# Patient Record
Sex: FEMALE | Race: WHITE | URBAN - METROPOLITAN AREA
[De-identification: names, ages, dates, MRNs, and addresses within clinical notes are randomized per-mention and may not be internally consistent; named-entity substitution may affect disease eponyms.]

---

## 2023-07-24 ENCOUNTER — TELEPHONE (OUTPATIENT)
Dept: RHEUMATOLOGY | Facility: CLINIC | Age: 53
End: 2023-07-24

## 2023-07-24 ENCOUNTER — OFFICE VISIT (OUTPATIENT)
Dept: RHEUMATOLOGY | Facility: CLINIC | Age: 53
End: 2023-07-24

## 2023-07-24 VITALS
HEART RATE: 68 BPM | BODY MASS INDEX: 25.61 KG/M2 | DIASTOLIC BLOOD PRESSURE: 88 MMHG | HEIGHT: 64 IN | SYSTOLIC BLOOD PRESSURE: 127 MMHG | WEIGHT: 150 LBS

## 2023-07-24 DIAGNOSIS — M05.79 RHEUMATOID ARTHRITIS INVOLVING MULTIPLE SITES WITH POSITIVE RHEUMATOID FACTOR (HCC): Primary | ICD-10-CM

## 2023-07-24 DIAGNOSIS — Z51.81 MEDICATION MONITORING ENCOUNTER: ICD-10-CM

## 2023-07-24 PROCEDURE — 99204 OFFICE O/P NEW MOD 45 MIN: CPT | Performed by: INTERNAL MEDICINE

## 2023-07-24 PROCEDURE — 3079F DIAST BP 80-89 MM HG: CPT | Performed by: INTERNAL MEDICINE

## 2023-07-24 PROCEDURE — 3074F SYST BP LT 130 MM HG: CPT | Performed by: INTERNAL MEDICINE

## 2023-07-24 PROCEDURE — 3008F BODY MASS INDEX DOCD: CPT | Performed by: INTERNAL MEDICINE

## 2023-07-24 RX ORDER — CLOBETASOL PROPIONATE 0.5 MG/G
1 CREAM TOPICAL 2 TIMES DAILY
COMMUNITY

## 2023-07-24 RX ORDER — METHYLPREDNISOLONE 4 MG/1
TABLET ORAL
Qty: 1 EACH | Refills: 0 | Status: SHIPPED | OUTPATIENT
Start: 2023-07-24

## 2023-07-24 RX ORDER — CITALOPRAM 20 MG/1
TABLET ORAL
COMMUNITY
Start: 2021-07-24

## 2023-07-24 RX ORDER — LEVOTHYROXINE SODIUM 88 UG/1
TABLET ORAL
COMMUNITY
Start: 2002-07-24

## 2023-07-24 RX ORDER — MEDROXYPROGESTERONE ACETATE 150 MG/ML
50 INJECTION, SUSPENSION INTRAMUSCULAR WEEKLY
COMMUNITY
Start: 2015-07-24 | End: 2023-07-24

## 2023-07-24 RX ORDER — MEDROXYPROGESTERONE ACETATE 150 MG/ML
50 INJECTION, SUSPENSION INTRAMUSCULAR WEEKLY
Qty: 4.2 ML | Refills: 0 | Status: SHIPPED | OUTPATIENT
Start: 2023-07-24

## 2023-07-24 NOTE — TELEPHONE ENCOUNTER
Per [employer]: Prior Authorization duplicate/approved  Notes  An active PA is already on file with expiration date of 03/12/2024.  Please wait to resubmit request within 60 days of that expiration date to obtain a PA renewal.

## 2023-07-24 NOTE — TELEPHONE ENCOUNTER
This patient is on Enbrel weekly. She is a new patient. I sent it to [facility].   I want to make sure that a PA is not needed

## 2023-07-24 NOTE — PATIENT INSTRUCTIONS
You were seen for RA  Having some stiffness in your left index finger  I gave you a Medrol Dosepak  Continue Enbrel for now, [facility]  Blood work tomorrow  See me in 3 months

## 2023-07-25 ENCOUNTER — OFFICE VISIT (OUTPATIENT)
Dept: INTERNAL MEDICINE CLINIC | Facility: CLINIC | Age: 53
End: 2023-07-25

## 2023-07-25 VITALS
WEIGHT: 151 LBS | SYSTOLIC BLOOD PRESSURE: 109 MMHG | HEART RATE: 78 BPM | DIASTOLIC BLOOD PRESSURE: 79 MMHG | BODY MASS INDEX: 25.78 KG/M2 | HEIGHT: 64 IN

## 2023-07-25 DIAGNOSIS — Z12.11 COLON CANCER SCREENING: ICD-10-CM

## 2023-07-25 DIAGNOSIS — Z00.00 PHYSICAL EXAM, ANNUAL: Primary | ICD-10-CM

## 2023-07-25 DIAGNOSIS — R06.83 SNORES: ICD-10-CM

## 2023-07-25 DIAGNOSIS — D22.9 ATYPICAL MOLE: ICD-10-CM

## 2023-07-25 DIAGNOSIS — Z12.31 VISIT FOR SCREENING MAMMOGRAM: ICD-10-CM

## 2023-07-25 DIAGNOSIS — Z12.4 CERVICAL CANCER SCREENING: ICD-10-CM

## 2023-07-25 PROBLEM — M05.79 RHEUMATOID ARTHRITIS INVOLVING MULTIPLE SITES WITH POSITIVE RHEUMATOID FACTOR (HCC): Status: ACTIVE | Noted: 2023-07-25

## 2023-07-25 PROCEDURE — 3008F BODY MASS INDEX DOCD: CPT | Performed by: INTERNAL MEDICINE

## 2023-07-25 PROCEDURE — 99386 PREV VISIT NEW AGE 40-64: CPT | Performed by: INTERNAL MEDICINE

## 2023-07-25 PROCEDURE — 3078F DIAST BP <80 MM HG: CPT | Performed by: INTERNAL MEDICINE

## 2023-07-25 PROCEDURE — 3074F SYST BP LT 130 MM HG: CPT | Performed by: INTERNAL MEDICINE

## 2023-08-08 ENCOUNTER — PATIENT MESSAGE (OUTPATIENT)
Dept: RHEUMATOLOGY | Facility: CLINIC | Age: 53
End: 2023-08-08

## 2023-08-09 NOTE — TELEPHONE ENCOUNTER
From: Alfonso Langley  To: Gregoria Pedroza MD  Sent: 8/8/2023 6:18 PM CDT  Subject: Blood test    Please confirm that Dr Neli Pardo sent the order for blood work to [facility]. I have an appointment next week.

## 2023-08-17 LAB
ABSOLUTE BASOPHILS: 50 CELLS/UL (ref 0–200)
ABSOLUTE EOSINOPHILS: 143 CELLS/UL (ref 15–500)
ABSOLUTE LYMPHOCYTES: 2200 CELLS/UL (ref 850–3900)
ABSOLUTE MONOCYTES: 512 CELLS/UL (ref 200–950)
ABSOLUTE NEUTROPHILS: 2596 CELLS/UL (ref 1500–7800)
ALBUMIN/GLOBULIN RATIO: 1.3 (CALC) (ref 1–2.5)
ALBUMIN: 4.1 G/DL (ref 3.6–5.1)
ALKALINE PHOSPHATASE: 51 U/L (ref 37–153)
ALT: 20 U/L (ref 6–29)
AST: 22 U/L (ref 10–35)
BASOPHILS: 0.9 %
BILIRUBIN, TOTAL: 0.5 MG/DL (ref 0.2–1.2)
BUN: 12 MG/DL (ref 7–25)
CALCIUM: 9.5 MG/DL (ref 8.6–10.4)
CARBON DIOXIDE: 24 MMOL/L (ref 20–32)
CHLORIDE: 104 MMOL/L (ref 98–110)
CREATININE: 0.84 MG/DL (ref 0.5–1.03)
EGFR: 84 ML/MIN/1.73M2
EOSINOPHILS: 2.6 %
GLOBULIN: 3.1 G/DL (CALC) (ref 1.9–3.7)
GLUCOSE: 81 MG/DL (ref 65–99)
HEMATOCRIT: 39.3 % (ref 35–45)
HEMOGLOBIN: 13.1 G/DL (ref 11.7–15.5)
LYMPHOCYTES: 40 %
MCH: 30.3 PG (ref 27–33)
MCHC: 33.3 G/DL (ref 32–36)
MCV: 91 FL (ref 80–100)
MONOCYTES: 9.3 %
MPV: 9.2 FL (ref 7.5–12.5)
NEUTROPHILS: 47.2 %
PLATELET COUNT: 325 THOUSAND/UL (ref 140–400)
POTASSIUM: 4.2 MMOL/L (ref 3.5–5.3)
PROTEIN, TOTAL: 7.2 G/DL (ref 6.1–8.1)
RDW: 12.1 % (ref 11–15)
RED BLOOD CELL COUNT: 4.32 MILLION/UL (ref 3.8–5.1)
SODIUM: 140 MMOL/L (ref 135–146)
WHITE BLOOD CELL COUNT: 5.5 THOUSAND/UL (ref 3.8–10.8)

## 2023-08-18 RX ORDER — LEVOTHYROXINE SODIUM 88 UG/1
88 TABLET ORAL
Qty: 90 TABLET | Refills: 0 | Status: SHIPPED | OUTPATIENT
Start: 2023-08-18

## 2023-08-18 NOTE — TELEPHONE ENCOUNTER
Refill sent to wrong Department.     Requested Prescriptions     Pending Prescriptions Disp Refills    levothyroxine 88 MCG Oral Tab  0     LOV: 7/25/23  Future Appointments   Date Time Provider Basil Orona   9/16/2023  9:40 AM [address]   10/25/2023 11:40 AM Kike Stanley MD 2014 [facility]     Labs:   Component      Latest Ref Rng 8/16/2023   WBC      3.8 - 10.8 Thousand/uL 5.5    RBC      3.80 - 5.10 Million/uL 4.32    Hemoglobin      11.7 - 15.5 g/dL 13.1    Hematocrit      35.0 - 45.0 % 39.3    MCV      80.0 - 100.0 fL 91.0    MCH      27.0 - 33.0 pg 30.3    MCHC      32.0 - 36.0 g/dL 33.3    RDW      11.0 - 15.0 % 12.1    Platelet Count      698 - 400 Thousand/uL 325    MPV      7.5 - 12.5 fL 9.2    Neutrophils Absolute      1,500 - 7,800 cells/uL 2,596    Lymphocytes Absolute      850 - 3,900 cells/uL 2,200    Monocytes Absolute      200 - 950 cells/uL 512    Eosinophils Absolute      15 - 500 cells/uL 143    Basophils Absolute      0 - 200 cells/uL 50    Neutrophils %      % 47.2    Lymphocytes %      % 40.0    Monocytes %      % 9.3    Eosinophils %      % 2.6    Basophils %      % 0.9    Glucose      65 - 99 mg/dL 81    BUN      7 - 25 mg/dL 12    CREATININE      0.50 - 1.03 mg/dL 0.84    EGFR      > OR = 60 mL/min/1.73m2 84    BUN/CREATININE RATIO      6 - 22 (calc) SEE NOTE:    Sodium      135 - 146 mmol/L 140    Potassium      3.5 - 5.3 mmol/L 4.2    Chloride      98 - 110 mmol/L 104    Carbon Dioxide, Total      20 - 32 mmol/L 24    CALCIUM      8.6 - 10.4 mg/dL 9.5    PROTEIN, TOTAL      6.1 - 8.1 g/dL 7.2    Albumin      3.6 - 5.1 g/dL 4.1    Globulin      1.9 - 3.7 g/dL (calc) 3.1    A/G Ratio      1.0 - 2.5 (calc) 1.3    Total Bilirubin      0.2 - 1.2 mg/dL 0.5    Alkaline Phosphatase      37 - 153 U/L 51    AST (SGOT)      10 - 35 U/L 22    ALT (SGPT)      6 - 29 U/L 20    Cholesterol, Total      <200 mg/dL 208 (H)    HDL Cholesterol      > OR = 50 mg/dL 85 Triglycerides      <150 mg/dL 88    LDL Cholesterol Calc      mg/dL (calc) 105 (H)    Chol/HDL Ratio      <5.0 (calc) 2.4    NON-HDL CHOLESTEROL      <130 mg/dL (calc) 123    TSH      mIU/L 0.94       Legend:  (H) High

## 2023-09-05 ENCOUNTER — NURSE ONLY (OUTPATIENT)
Facility: CLINIC | Age: 53
End: 2023-09-05

## 2023-09-05 DIAGNOSIS — Z12.11 COLON CANCER SCREENING: Primary | ICD-10-CM

## 2023-09-05 RX ORDER — SODIUM, POTASSIUM,MAG SULFATES 17.5-3.13G
SOLUTION, RECONSTITUTED, ORAL ORAL
Qty: 1 EACH | Refills: 0 | Status: SHIPPED | OUTPATIENT
Start: 2023-09-05

## 2023-09-14 ENCOUNTER — PATIENT MESSAGE (OUTPATIENT)
Dept: INTERNAL MEDICINE CLINIC | Facility: CLINIC | Age: 53
End: 2023-09-14

## 2023-09-14 ENCOUNTER — PATIENT MESSAGE (OUTPATIENT)
Dept: RHEUMATOLOGY | Facility: CLINIC | Age: 53
End: 2023-09-14

## 2023-09-14 NOTE — TELEPHONE ENCOUNTER
Left voicemail on identified message that Dr Iam Grant has reviewed her message and asked I call to discuss appointments. Informed pt MD currently has an opening 10/18/23 at 5pm and to call as soon as possible if she wants to reschedule to that date. Did tell pt I would put her on a wait list if any appointment opens up sooner. Call back number left.

## 2023-09-14 NOTE — TELEPHONE ENCOUNTER
Yes if patient can make an appointment so we can discuss either adding medications or switching the Enbrel.   She could always be put on the wait list also if the appointments are too far ahead

## 2023-09-14 NOTE — TELEPHONE ENCOUNTER
From: Yodit Running  To: Dilcia Greenberg  Sent: 9/14/2023 1:53 PM CDT  Subject: RA activity    Dr. Ene Luu    My RA is still active despite being consistent with my medication. Most recently it has affected my eye. My eye doctor has given me steroid eye drops that are working.  He asked that I reach out to you to see if we should have another appointment     Gayatri Monzon

## 2023-09-15 ENCOUNTER — PATIENT MESSAGE (OUTPATIENT)
Dept: INTERNAL MEDICINE CLINIC | Facility: CLINIC | Age: 53
End: 2023-09-15

## 2023-09-16 ENCOUNTER — HOSPITAL ENCOUNTER (OUTPATIENT)
Dept: MAMMOGRAPHY | Facility: HOSPITAL | Age: 53
Discharge: HOME | End: 2023-09-16
Attending: INTERNAL MEDICINE
Payer: COMMERCIAL

## 2023-09-16 DIAGNOSIS — Z12.31 VISIT FOR SCREENING MAMMOGRAM: ICD-10-CM

## 2023-09-16 PROCEDURE — 77067 SCR MAMMO BI INCL CAD: CPT | Performed by: INTERNAL MEDICINE

## 2023-09-16 PROCEDURE — 77063 BREAST TOMOSYNTHESIS BI: CPT | Performed by: INTERNAL MEDICINE

## 2023-10-27 ENCOUNTER — OFFICE VISIT (OUTPATIENT)
Dept: RHEUMATOLOGY | Facility: CLINIC | Age: 53
End: 2023-10-27

## 2023-10-27 ENCOUNTER — LAB ENCOUNTER (OUTPATIENT)
Dept: LAB | Facility: HOSPITAL | Age: 53
End: 2023-10-27
Attending: INTERNAL MEDICINE

## 2023-10-27 VITALS
DIASTOLIC BLOOD PRESSURE: 70 MMHG | HEART RATE: 76 BPM | SYSTOLIC BLOOD PRESSURE: 102 MMHG | HEIGHT: 64 IN | BODY MASS INDEX: 25.78 KG/M2 | WEIGHT: 151 LBS

## 2023-10-27 DIAGNOSIS — M05.79 RHEUMATOID ARTHRITIS INVOLVING MULTIPLE SITES WITH POSITIVE RHEUMATOID FACTOR (HCC): Primary | ICD-10-CM

## 2023-10-27 DIAGNOSIS — M05.79 RHEUMATOID ARTHRITIS INVOLVING MULTIPLE SITES WITH POSITIVE RHEUMATOID FACTOR (HCC): ICD-10-CM

## 2023-10-27 DIAGNOSIS — Z51.81 ENCOUNTER FOR THERAPEUTIC DRUG MONITORING: ICD-10-CM

## 2023-10-27 LAB
ALBUMIN SERPL-MCNC: 3.9 G/DL (ref 3.4–5)
ALT SERPL-CCNC: 19 U/L
AST SERPL-CCNC: 18 U/L (ref 15–37)
BASOPHILS # BLD AUTO: 0.06 X10(3) UL (ref 0–0.2)
BASOPHILS NFR BLD AUTO: 0.8 %
CREAT BLD-MCNC: 0.89 MG/DL
CRP SERPL-MCNC: 0.93 MG/DL (ref ?–0.3)
DEPRECATED RDW RBC AUTO: 41.7 FL (ref 35.1–46.3)
EGFRCR SERPLBLD CKD-EPI 2021: 78 ML/MIN/1.73M2 (ref 60–?)
EOSINOPHIL # BLD AUTO: 0.19 X10(3) UL (ref 0–0.7)
EOSINOPHIL NFR BLD AUTO: 2.4 %
ERYTHROCYTE [DISTWIDTH] IN BLOOD BY AUTOMATED COUNT: 12.2 % (ref 11–15)
ERYTHROCYTE [SEDIMENTATION RATE] IN BLOOD: 22 MM/HR
HBV CORE AB SERPL QL IA: NONREACTIVE
HBV SURFACE AB SER QL: NONREACTIVE
HBV SURFACE AB SERPL IA-ACNC: <3.1 MIU/ML
HBV SURFACE AG SER-ACNC: <0.1 [IU]/L
HBV SURFACE AG SERPL QL IA: NONREACTIVE
HCT VFR BLD AUTO: 40.4 %
HCV AB SERPL QL IA: NONREACTIVE
HGB BLD-MCNC: 13.7 G/DL
IMM GRANULOCYTES # BLD AUTO: 0.02 X10(3) UL (ref 0–1)
IMM GRANULOCYTES NFR BLD: 0.3 %
LYMPHOCYTES # BLD AUTO: 2.89 X10(3) UL (ref 1–4)
LYMPHOCYTES NFR BLD AUTO: 36.3 %
MCH RBC QN AUTO: 31.3 PG (ref 26–34)
MCHC RBC AUTO-ENTMCNC: 33.9 G/DL (ref 31–37)
MCV RBC AUTO: 92.2 FL
MONOCYTES # BLD AUTO: 0.57 X10(3) UL (ref 0.1–1)
MONOCYTES NFR BLD AUTO: 7.2 %
NEUTROPHILS # BLD AUTO: 4.23 X10 (3) UL (ref 1.5–7.7)
NEUTROPHILS # BLD AUTO: 4.23 X10(3) UL (ref 1.5–7.7)
NEUTROPHILS NFR BLD AUTO: 53 %
PLATELET # BLD AUTO: 387 10(3)UL (ref 150–450)
RBC # BLD AUTO: 4.38 X10(6)UL
WBC # BLD AUTO: 8 X10(3) UL (ref 4–11)

## 2023-10-27 PROCEDURE — 86704 HEP B CORE ANTIBODY TOTAL: CPT | Performed by: INTERNAL MEDICINE

## 2023-10-27 PROCEDURE — 36415 COLL VENOUS BLD VENIPUNCTURE: CPT

## 2023-10-27 PROCEDURE — 85652 RBC SED RATE AUTOMATED: CPT

## 2023-10-27 PROCEDURE — 82040 ASSAY OF SERUM ALBUMIN: CPT

## 2023-10-27 PROCEDURE — 86480 TB TEST CELL IMMUN MEASURE: CPT | Performed by: INTERNAL MEDICINE

## 2023-10-27 PROCEDURE — 86140 C-REACTIVE PROTEIN: CPT

## 2023-10-27 PROCEDURE — 84450 TRANSFERASE (AST) (SGOT): CPT

## 2023-10-27 PROCEDURE — 86803 HEPATITIS C AB TEST: CPT | Performed by: INTERNAL MEDICINE

## 2023-10-27 PROCEDURE — 99214 OFFICE O/P EST MOD 30 MIN: CPT | Performed by: INTERNAL MEDICINE

## 2023-10-27 PROCEDURE — 3078F DIAST BP <80 MM HG: CPT | Performed by: INTERNAL MEDICINE

## 2023-10-27 PROCEDURE — 3074F SYST BP LT 130 MM HG: CPT | Performed by: INTERNAL MEDICINE

## 2023-10-27 PROCEDURE — 82565 ASSAY OF CREATININE: CPT

## 2023-10-27 PROCEDURE — 3008F BODY MASS INDEX DOCD: CPT | Performed by: INTERNAL MEDICINE

## 2023-10-27 PROCEDURE — 85025 COMPLETE CBC W/AUTO DIFF WBC: CPT

## 2023-10-27 PROCEDURE — 87340 HEPATITIS B SURFACE AG IA: CPT | Performed by: INTERNAL MEDICINE

## 2023-10-27 PROCEDURE — 86706 HEP B SURFACE ANTIBODY: CPT | Performed by: INTERNAL MEDICINE

## 2023-10-27 PROCEDURE — 84460 ALANINE AMINO (ALT) (SGPT): CPT

## 2023-10-27 RX ORDER — METHOTREXATE 2.5 MG/1
10 TABLET ORAL WEEKLY
Qty: 52 TABLET | Refills: 0 | Status: SHIPPED | OUTPATIENT
Start: 2023-10-27

## 2023-10-27 RX ORDER — FOLIC ACID 1 MG/1
1 TABLET ORAL DAILY
Qty: 90 TABLET | Refills: 0 | Status: SHIPPED | OUTPATIENT
Start: 2023-10-27

## 2023-10-27 RX ORDER — CYCLOSPORINE 0.5 MG/ML
1 EMULSION OPHTHALMIC EVERY 12 HOURS
COMMUNITY
Start: 2023-10-13

## 2023-10-27 NOTE — PROGRESS NOTES
Dmitri John is a 47year old female. HPI:   Patient presents with:  Rheumatoid Arthritis    Ophthalmogy Dr. Cody Tanner at [facility]     I had the pleasure of seeing Dmitri John on 10/27/2023 for follow up Seropositive RA     Current medications:  Enbrel weekly- started 2014  Previous medications:  Methotrexate- [id number]  Blood work:  RF 72, CCP>250    Interval History: This is a 48 yo F with hx of Hypothyroid, GERD, Lichen sclerosis, Depression and Seropositive RA presents to establish care for RA. She was diagnosed in 2009 when she presented with symmetrical joint pain and swelling involving her hands, wrists, shoulders and feet. She was initially placed on methotrexate titrated up to 10 pills weekly. She then moved to [facility] and was placed on Enbrel weekly in 2014. She stopped methotrexate. She has been doing well on Enbrel. She lost her insurance and was off Enbrel for about 1.5-month from February to March. She started to experience some more joint pain involving her hands. Now back on Enbrel. Having some stiffness in her left index finger. Otherwise no other joint pain or swelling. No history of psoriasis. No lower back pain. 10/27/2023:  Presents for f/u of Seropositive RA  On Enbrel now for the past 3 mos, was off of it due to moving   Was not doing well up until Saturday in hands and feet, swelling in the fingers and stiffness   Still has some stiffness in the pinky's and the left index finger, mild swelling  Was on methotrexate previously and wants to try it again.  Also Lichen sclerosis has worsened off methotrexate   Was seen by eye doctor for left eye pain and was seen by ophthalmology and was told there was inflammation from RA      HISTORY:  Past Medical History:   Diagnosis Date    Depression     Hypothyroidism     Rheumatoid arthritis ([facility] [address]       Social Hx Reviewed   Family Hx Reviewed     Medications (Active prior to today's visit):  Current Outpatient Medications   Medication Sig Dispense Refill    Na Sulfate-K Sulfate-Mg Sulf (SUPREP BOWEL PREP KIT) 17.5-3.13-1.6 GM/177ML Oral Solution Take as discussed in clinic 1 each 0    levothyroxine 88 MCG Oral Tab Take 1 tablet (88 mcg total) by mouth before breakfast. 90 tablet 0    clobetasol 0.05 % External Cream Apply 1 Application topically in the morning and 1 Application before bedtime. citalopram 20 MG Oral Tab       ENBREL SURECLICK 50 MG/ML Subcutaneous Solution Auto-injector Inject 50 mg into the skin once a week. 4.2 mL 0    methylPREDNISolone 4 MG Oral Tablet Therapy Pack Take as directed on package. 1 each 0     .cmed  Allergies:  No Active Allergies      ROS:   All other ROS are negative. PHYSICAL EXAM:   GEN: AAOx3, NAD  HEENT: EOMI, PERRLA, no injection or icterus, oral mucosa moist, no oral lesions. No lymphadenopathy. No facial rash  CVS: RRR, no murmurs rubs or gallops. Equal 2+ distal pulses. LUNGS: CTAB, no increased work of breathing  ABDOMEN:  soft NT/ND, +BS, no HSM  SKIN: No rashes or skin lesions. No nail findings  MSK:  Cervical spine: FROM  Hands: no synovitis in DIP, PIP and MCP, strong full fists  Wrist: FROM, no pain or swelling or warmth on palpation  Elbow: FROM, no pain or swelling or warmth on palpation  Shoulders: FROM, no pain or swelling or warmth on palpation  Hip: normal log roll, no lateral hip pain, ORAL test negative b/l  Knees: FROM, no warmth or effusion present. No pain with ROM. Ankles: FROM, no pain or swelling or warmth on palpation  Feet: no pain with MTP squeeze, no toe swelling or pain or warmth on palpation with FROM  Spine: no lumbar or sacral pain on palpation. NEURO: Cranial nerves II-XII intact grossly. 5/5 strength throughout in both upper and lower extremities, sensation intact.   PSYCH: normal mood       LABS:     Component      Latest Ref Rng 10/27/2023   WBC      4.0 - 11.0 x10(3) uL 8.0    RBC      3.80 - 5.30 x10(6)uL 4.38    Hemoglobin      12.0 - 16.0 g/dL 13.7 Hematocrit      35.0 - 48.0 % 40.4    MCV      80.0 - 100.0 fL 92.2    MCH      26.0 - 34.0 pg 31.3    MCHC      31.0 - 37.0 g/dL 33.9    RDW-SD      35.1 - 46.3 fL 41.7    RDW      11.0 - 15.0 % 12.2    Platelet Count      846.7 - 450.0 10(3)uL 387.0    Prelim Neutrophil Abs      1.50 - 7.70 x10 (3) uL 4.23    Neutrophils Absolute      1.50 - 7.70 x10(3) uL 4.23    Lymphocytes Absolute      1.00 - 4.00 x10(3) uL 2.89    Monocytes Absolute      0.10 - 1.00 x10(3) uL 0.57    Eosinophils Absolute      0.00 - 0.70 x10(3) uL 0.19    Basophils Absolute      0.00 - 0.20 x10(3) uL 0.06    Immature Granulocyte Absolute      0.00 - 1.00 x10(3) uL 0.02    Neutrophils %      % 53.0    Lymphocytes %      % 36.3    Monocytes %      % 7.2    Eosinophils %      % 2.4    Basophils %      % 0.8    Immature Granulocyte %      % 0.3    CREATININE      0.55 - 1.02 mg/dL 0.89    EGFR      >=60 mL/min/1.73m2 78    Albumin      3.4 - 5.0 g/dL 3.9    ALT (SGPT)      13 - 56 U/L 19    AST (SGOT)      15 - 37 U/L 18    C-REACTIVE PROTEIN      <0.30 mg/dL 0.93 (H)    SED RATE      0 - 30 mm/Hr 22      Imaging:     XR hands 2013: There are no signs of acute trauma or is degenerative change. There is some very minimal juxtaarticular osteoporosis, but there is no evidence of joint soft-tissue swelling or marginal erosion. ASSESSMENT/PLAN:     Seropositive RA  - Found to have + RF and CCP. No erosions on x-rays  - Restarted Enbrel 3 mos ago, initially continued to have a lot of joint pain and swelling but symptoms improved last week  - She was on methotrexate previously. Her lichen planus has worsened off methotrexate.   Recently had inflammationin her eyes and was told it was from her RA  - Plan to add methotrexate 4 pills weekly and folic acid daily  - Blood work reviewed with patient, will continue to monitor every 3 months    Pt will f/u in 3-4 mos     Sidney Barreto MD  10/27/2023   12:00 PM

## 2023-10-27 NOTE — PATIENT INSTRUCTIONS
You were seen today for rheumatoid arthritis  You are having more joint pain but now is better on Enbrel  Plan to continue Enbrel weekly  Adding methotrexate 4 pills every 7 days and folic acid 1 pill a day  Blood work today and in 3 months  See me end of January or beginning of February

## 2023-10-27 NOTE — TELEPHONE ENCOUNTER
LOV: 10/27/23  Last Refilled:#4.2ml, 0rfs 7/24/23    Future Appointments   Date Time Provider Basil Fabiani   11/28/2023  9:30 AM Emma Templeton MD [address]   11/29/2023  7:30 AM [place], PROCEDURE [address] None   1/30/2024  8:40 AM Juanita Tomlinson MD 2014 [facility]     You were seen today for rheumatoid arthritis  You are having more joint pain but now is better on Enbrel  Plan to continue Enbrel weekly  Adding methotrexate 4 pills every 7 days and folic acid 1 pill a day  Blood work today and in 3 months  See me end of January or beginning of February            Electronically signed by Juanita Tomlinson MD at 10/27/2023 12:21 PM  Please advise.

## 2023-10-30 LAB
M TB IFN-G CD4+ T-CELLS BLD-ACNC: 0.04 IU/ML
M TB TUBERC IFN-G BLD QL: NEGATIVE
M TB TUBERC IGNF/MITOGEN IGNF CONTROL: >10 IU/ML
QFT TB1 AG MINUS NIL: 0.09 IU/ML
QFT TB2 AG MINUS NIL: 0.05 IU/ML

## 2023-10-31 RX ORDER — MEDROXYPROGESTERONE ACETATE 150 MG/ML
50 INJECTION, SUSPENSION INTRAMUSCULAR WEEKLY
Qty: 4.2 ML | Refills: 0 | Status: SHIPPED | OUTPATIENT
Start: 2023-10-31

## 2023-11-27 RX ORDER — CITALOPRAM 20 MG/1
20 TABLET ORAL DAILY
Qty: 90 TABLET | Refills: 2 | Status: SHIPPED | OUTPATIENT
Start: 2023-11-27

## 2023-11-27 RX ORDER — LEVOTHYROXINE SODIUM 88 UG/1
88 TABLET ORAL
Qty: 90 TABLET | Refills: 3 | Status: SHIPPED | OUTPATIENT
Start: 2023-11-27

## 2023-11-27 NOTE — TELEPHONE ENCOUNTER
Refill passed per [facility], [facility] protocol. Medication is listed as patient reported.     Requested Prescriptions   Pending Prescriptions Disp Refills    citalopram 20 MG Oral Tab  0       Psychiatric Non-Scheduled (Anti-Anxiety) Passed - 11/26/2023  5:44 PM        Passed - In person appointment or virtual visit in the past 6 mos or appointment in next 3 mos     Recent Outpatient Visits              1 month ago Rheumatoid arthritis involving multiple sites with positive rheumatoid factor ([facility] [address]    [address], [address], Gregoria Tompkins MD    Office Visit    2 months ago Colon cancer screening    [address], [address], [place]    Nurse Only    4 months ago Physical exam, annual    [address] [facility], Paige Lombard, MD    Office Visit    4 months ago Rheumatoid arthritis involving multiple sites with positive rheumatoid factor [facility])    [address], [address], Gregoria Tompkins MD    Office Visit          Future Appointments         Provider Department Appt Notes    Tomorrow Yomi Mejias MD [facility], [address], [place] - OB/GYN Lichen sclerosus    In 2 days [place], [address], [address], [place] Kapil @ [address]    In 2 months Kirsten Paredes MD [address], [address], [place] follow up                  Recent Outpatient Visits              1 month ago Rheumatoid arthritis involving multiple sites with positive rheumatoid factor ([facility] [address]    [address], [address], Gregoria Tompkins MD    Office Visit    2 months ago Colon cancer screening    [address], [address], [place]    Nurse Only    4 months ago Physical exam, annual    [address] [facility], Paige Lombard, MD    Office Visit    4 months ago Rheumatoid arthritis involving multiple sites with positive rheumatoid factor ([facility] [address]    [address], [address], Isra Cano MD    Office Visit          Future Appointments         Provider Department Appt Notes    Tomorrow [facility], [address], [address] - OB/GYN Lichen sclerosus    In 2 days [place], [address], [address], [place] [facility] @ [facility]    In 2 months Juanita Tomlinson MD [address], [facility] follow up

## 2023-11-27 NOTE — TELEPHONE ENCOUNTER
Requested Prescriptions     Pending Prescriptions Disp Refills    LEVOTHYROXINE 88 MCG Oral Tab [Pharmacy Med Name: L-THYROXINE (SYNTHROID) TABS 88MCG] 90 tablet 3     Sig: TAKE 1 TABLET BEFORE BREAKFAST     LOV: 7/23/23  Future Appointments   Date Time Provider Basil Orona   11/28/2023  9:30 AM Lulu Barreto MD [address] [facility] [address]  [facility] [address]   11/29/2023  7:30 AM [place], PROCEDURE [address] None   1/30/2024  8:40 AM Cathy Keith MD 2014 [facility]     Labs:   Component      Latest Ref Rng 8/16/2023   TSH      mIU/L 0.94

## 2023-11-28 ENCOUNTER — OFFICE VISIT (OUTPATIENT)
Dept: OBGYN CLINIC | Facility: CLINIC | Age: 53
End: 2023-11-28
Payer: COMMERCIAL

## 2023-11-28 ENCOUNTER — LAB ENCOUNTER (OUTPATIENT)
Dept: LAB | Facility: HOSPITAL | Age: 53
End: 2023-11-28
Attending: INTERNAL MEDICINE
Payer: COMMERCIAL

## 2023-11-28 VITALS
DIASTOLIC BLOOD PRESSURE: 78 MMHG | SYSTOLIC BLOOD PRESSURE: 110 MMHG | HEIGHT: 64 IN | WEIGHT: 149.81 LBS | BODY MASS INDEX: 25.57 KG/M2

## 2023-11-28 DIAGNOSIS — Z00.00 PHYSICAL EXAM, ANNUAL: ICD-10-CM

## 2023-11-28 DIAGNOSIS — N90.4 LICHEN SCLEROSUS OF VULVA: Primary | ICD-10-CM

## 2023-11-28 DIAGNOSIS — M05.79 RHEUMATOID ARTHRITIS INVOLVING MULTIPLE SITES WITH POSITIVE RHEUMATOID FACTOR (HCC): ICD-10-CM

## 2023-11-28 DIAGNOSIS — Z51.81 ENCOUNTER FOR THERAPEUTIC DRUG MONITORING: ICD-10-CM

## 2023-11-28 DIAGNOSIS — N90.89 VULVAR SKIN TAG: ICD-10-CM

## 2023-11-28 LAB
ALBUMIN SERPL-MCNC: 4.5 G/DL (ref 3.2–4.8)
ALT SERPL-CCNC: 16 U/L
AST SERPL-CCNC: 23 U/L (ref ?–34)
BASOPHILS # BLD AUTO: 0.06 X10(3) UL (ref 0–0.2)
BASOPHILS NFR BLD AUTO: 1 %
CHOLEST SERPL-MCNC: 229 MG/DL (ref ?–200)
CREAT BLD-MCNC: 1.01 MG/DL
CRP SERPL-MCNC: <0.4 MG/DL (ref ?–1)
DEPRECATED RDW RBC AUTO: 41.6 FL (ref 35.1–46.3)
EGFRCR SERPLBLD CKD-EPI 2021: 67 ML/MIN/1.73M2 (ref 60–?)
EOSINOPHIL # BLD AUTO: 0.14 X10(3) UL (ref 0–0.7)
EOSINOPHIL NFR BLD AUTO: 2.3 %
ERYTHROCYTE [DISTWIDTH] IN BLOOD BY AUTOMATED COUNT: 12.7 % (ref 11–15)
ERYTHROCYTE [SEDIMENTATION RATE] IN BLOOD: 25 MM/HR
FASTING PATIENT LIPID ANSWER: YES
HCT VFR BLD AUTO: 40.5 %
HDLC SERPL-MCNC: 84 MG/DL (ref 40–59)
HGB BLD-MCNC: 13.5 G/DL
IMM GRANULOCYTES # BLD AUTO: 0.02 X10(3) UL (ref 0–1)
IMM GRANULOCYTES NFR BLD: 0.3 %
LDLC SERPL CALC-MCNC: 136 MG/DL (ref ?–100)
LYMPHOCYTES # BLD AUTO: 2.37 X10(3) UL (ref 1–4)
LYMPHOCYTES NFR BLD AUTO: 38.9 %
MCH RBC QN AUTO: 30.3 PG (ref 26–34)
MCHC RBC AUTO-ENTMCNC: 33.3 G/DL (ref 31–37)
MCV RBC AUTO: 91 FL
MONOCYTES # BLD AUTO: 0.35 X10(3) UL (ref 0.1–1)
MONOCYTES NFR BLD AUTO: 5.7 %
NEUTROPHILS # BLD AUTO: 3.16 X10 (3) UL (ref 1.5–7.7)
NEUTROPHILS # BLD AUTO: 3.16 X10(3) UL (ref 1.5–7.7)
NEUTROPHILS NFR BLD AUTO: 51.8 %
NONHDLC SERPL-MCNC: 145 MG/DL (ref ?–130)
PLATELET # BLD AUTO: 351 10(3)UL (ref 150–450)
RBC # BLD AUTO: 4.45 X10(6)UL
TRIGL SERPL-MCNC: 55 MG/DL (ref 30–149)
TSI SER-ACNC: 0.88 MIU/ML (ref 0.55–4.78)
VLDLC SERPL CALC-MCNC: 10 MG/DL (ref 0–30)
WBC # BLD AUTO: 6.1 X10(3) UL (ref 4–11)

## 2023-11-28 PROCEDURE — 3078F DIAST BP <80 MM HG: CPT | Performed by: OBSTETRICS & GYNECOLOGY

## 2023-11-28 PROCEDURE — 85025 COMPLETE CBC W/AUTO DIFF WBC: CPT

## 2023-11-28 PROCEDURE — 86140 C-REACTIVE PROTEIN: CPT

## 2023-11-28 PROCEDURE — 86480 TB TEST CELL IMMUN MEASURE: CPT | Performed by: INTERNAL MEDICINE

## 2023-11-28 PROCEDURE — 80061 LIPID PANEL: CPT

## 2023-11-28 PROCEDURE — 82565 ASSAY OF CREATININE: CPT

## 2023-11-28 PROCEDURE — 36415 COLL VENOUS BLD VENIPUNCTURE: CPT

## 2023-11-28 PROCEDURE — 84443 ASSAY THYROID STIM HORMONE: CPT

## 2023-11-28 PROCEDURE — 85652 RBC SED RATE AUTOMATED: CPT

## 2023-11-28 PROCEDURE — 3008F BODY MASS INDEX DOCD: CPT | Performed by: OBSTETRICS & GYNECOLOGY

## 2023-11-28 PROCEDURE — 82040 ASSAY OF SERUM ALBUMIN: CPT

## 2023-11-28 PROCEDURE — 3074F SYST BP LT 130 MM HG: CPT | Performed by: OBSTETRICS & GYNECOLOGY

## 2023-11-28 PROCEDURE — 99203 OFFICE O/P NEW LOW 30 MIN: CPT | Performed by: OBSTETRICS & GYNECOLOGY

## 2023-11-28 PROCEDURE — 84450 TRANSFERASE (AST) (SGOT): CPT

## 2023-11-28 PROCEDURE — 84460 ALANINE AMINO (ALT) (SGPT): CPT

## 2023-11-28 NOTE — PROGRESS NOTES
New Patient GYN History and Physical  [facility] OB/GYN    CHIEF COMPLAINT:    Chief Complaint   Patient presents with    [facility] Care    Vaginal Problem     Lichen  sclerosus      HISTORY OF PRESENT ILLNESS:   Darryle Dames is a 47year old female   who presents to establish care for lichen sclerosus - states daignosed in , but states had symptoms as a child. Previously treated with topical steroids/clobetasol. History rheumatoid arthritis and was placed on methotrexate and had no flare ups, but was switched for embryl and flareups returned. More recently was restarted on methotrexate x 4 weeks and has noticed improvement. States recently had flare up x 2 months. States uses clobetasol 2-3/day with flare ups. Otherwise uses once weekly. Has noticed that 2-3 weeks before menses has flare up and resolves without treatment. States has new skin tag noted about 4 to 5 months ago. Perimenopausal - last menses . In last year had 2 menses. PAST GYNECOLOGICAL HISTORY & OTHER PREVENTIVE MEDICINE  LMP: Patient's last menstrual period was 2023 (approximate).   Menarche: 16years old (2023  9:39 AM)  Period Cycle (Days): Lmp 2023 , prior to this 2022 (2023  9:39 AM)  Hx Prior Abnormal Pap: Yes (hpv over 15 years ago) (2023  9:39 AM)  Pap Date: 21 (2023  1:62 AM)    Complications: denies  Gravita/Parity:   Contraception: current -vasectomy; Previous - oral contraceptive pill, Depo Provera  Sexually transmitted disease history:HPV  Number of sexual partners: current sexual partners: 1, yrs, Lifetime partners:   Pap history: NILM, neg HRHPV Last pap/result: 2021; history abnormals: colposcopy  Date of last mammogram: UTD, normal; history abnormals denies  Last Colonoscopy: scheduled ; history abnormals   Abuse history: denies  Vaginal discharge: denies  Bladder symptoms: denies    PAST MEDICAL HISTORY:   Past Medical History:   Diagnosis Date    Depression Disorder of thyroid     Hypothyroidism     Rheumatoid arthritis ([facility] [address]         PAST SURGICAL HISTORY:   Past Surgical History:   Procedure Laterality Date    Tonsillectomy          PAST OB HISTORY:  OB History    Para Term  AB Living   0 0           SAB IAB Ectopic Multiple Live Births                   CURRENT MEDICATIONS:      Current Outpatient Medications:     levothyroxine 88 MCG Oral Tab, Take 1 tablet (88 mcg total) by mouth before breakfast., Disp: 90 tablet, Rfl: 3    citalopram 20 MG Oral Tab, Take 1 tablet (20 mg total) by mouth daily. , Disp: 90 tablet, Rfl: 2    ENBREL SURECLICK 50 MG/ML Subcutaneous Solution Auto-injector, Inject 50 mg into the skin once a week., Disp: 4.2 mL, Rfl: 0    cycloSPORINE 0.05 % Ophthalmic Emulsion, Place 1 drop into both eyes every 12 (twelve) hours. , Disp: , Rfl:     methotrexate 2.5 MG Oral Tab, Take 4 tablets (10 mg total) by mouth once a week., Disp: 52 tablet, Rfl: 0    folic acid 1 MG Oral Tab, Take 1 tablet (1 mg total) by mouth daily. , Disp: 90 tablet, Rfl: 0    Na Sulfate-K Sulfate-Mg Sulf (SUPREP BOWEL PREP KIT) 17.5-3.13-1.6 GM/177ML Oral Solution, Take as discussed in clinic, Disp: 1 each, Rfl: 0    clobetasol 0.05 % External Cream, Apply 1 Application topically in the morning and 1 Application before bedtime. , Disp: , Rfl:     methylPREDNISolone 4 MG Oral Tablet Therapy Pack, Take as directed on package. , Disp: 1 each, Rfl: 0    ALLERGIES:  No Known Allergies    SOCIAL HISTORY:  Social History     Socioeconomic History    Marital status:    Tobacco Use    Smoking status: Never    Smokeless tobacco: Never   Vaping Use    Vaping Use: Never used   Substance and Sexual Activity    Alcohol use: Yes     Comment: Rarely    Drug use: Yes     Types: Cannabis    Sexual activity: Yes   Other Topics Concern    Blood Transfusions No       FAMILY HISTORY:  No family history on file.   ASSESSMENTS:  PHYSICAL EXAM:   Patient's last menstrual period was 06/01/2023 (approximate). Vitals:    11/28/23 0937   BP: 110/78   Weight: 149 lb 12.8 oz (67.9 kg)   Height: 64\"     CONSTITUTIONAL: Awake, alert, cooperative, no apparent distress, and appears stated age   NECK: Supple, symmetrical, trachea midline, no adenopathy, thyroid symmetric, not enlarged      GENITAL/URINARY:    External Genitalia:  General appearance; normal, Hair distribution; normal, Lesions - agglutination of labia minora anteriorly under clitoral aden. Narrowing of introitus present. 7 mm flat white lesion inner right labia minora; polypoid lesion right of labia majora 3 x 5 mm  Urethral Meatus:  Lesions absent, Prolapse absent  Bladder:  Tenderness absent, Cystocele absent  Vagina:  Discharge absent, Lesions absent, Pelvic support normal  Cervix:  Lesions absent, Discharge absent, Tenderness absent  Uterus:  Size normal, Masses absent, Tenderness absent  Adnexa: Masses absent, Tenderness absent  Anus/Perineum:  Lesions absent    MUSCULOSKELETAL: There is no redness, warmth, or swelling of the joints. Full range of motion noted. Motor strength is 5 out of 5 all extremities bilaterally. Tone is normal.  NEUROLOGIC: Patient is awake, alert and oriented to name, place and time. Casual gait is normal.  SKIN: no bruising or bleeding and no rashes  PSYCHIATRIC: Behavior:  Appropriate  Mood:  appropriate  ASSESSMENT AND PLAN:  1. Lichen sclerosus of vulva  Reviewed care measures. Written info provided. Continue weekly clobetasol for suppression and perform taper with flares. Aveeno oatmeal bath as well. Exams q 6 months to examine for new lesions. Has had some improvement with restart of methotrexate.     Follow up for punch biopsy of labial lesion and removal skin tag.     2. Vulvar skin tag    Pato Holliday MD

## 2023-11-28 NOTE — PATIENT INSTRUCTIONS
Patient education: Vulvar lichen sclerosus (Beyond the Basics)  AUTHORS:  JAMES Fung [facility], [facility], MD Dennis El MBBS [facility] BSc [facility])  SECTION EDITORS:  MD Yves Mace MD, [place], [place]  DEPUTY :  Layne Coelho MD    All topics are updated as new evidence becomes available and our peer review process is complete. Literature review current through: Oct 2023. This topic last updated: Dec 21, 2020. Please read the Disclaimer at the end of this page. INTRODUCTION  Lichen sclerosus (LS) is a skin disorder that causes the skin to become thin, whitened, and wrinkled, and can cause itching and pain. LS usually occurs in postmenopausal women, although men, children, and premenopausal women may be affected. It can develop on any skin surface, but in women it most commonly occurs near the clitoris, on the labia (the inner and outer genital lips), and in the anal region (figure 1). In 15 to 20 percent of patients, LS lesions develop on other skin surfaces, such as the thighs, breasts, wrists, shoulders, neck, and even inside the mouth. It is not clear exactly how many people have LS. Estimates for LS involving the female genitals vary from 1 in 27 older adult women seen in general gynecology offices to 1 in 300 to 1000 patients referred to dermatologists. LICHEN SCLEROSUS CAUSES AND RISK FACTORS  The cause of lichen sclerosus (LS) is not clear; health care providers suspect that a number of factors may be involved. Genetic factors -- LS seems to be more common in some families. People who are genetically predisposed to LS may develop symptoms after experiencing trauma, injury, or sexual abuse. Disorders of the immune system -- LS in females may be an autoimmune disorder, in which the body's immune system mistakenly attacks and injures the skin.  Women with LS are at greater risk of developing other autoimmune disorders, such as some types of thyroid disease, anemia, diabetes, alopecia areata, and vitiligo [1]. Infections -- Researchers have not been able to clearly demonstrate any relationship between infections and LS. LS is not contagious. Hormones -- LS is more common in prepubertal girls and postmenopausal women, suggesting that hormonal changes influence the disease. However, treatments such as hormone replacement therapy or the application of testosterone or progesterone have not been shown to be effective for females with LS. Urine -- There is evidence that urine may contribute to genital LS in males, in that microscopic droplets of urine may pool between the glans penis and the foreskin, contributing to LS in uncircumcised men. However, whether urine plays a part in genital LS in females is unknown. LICHEN SCLEROSUS (LS) SIGNS AND SYMPTOMS   Features of genital LS in women -- Some women with genital LS feel dull, painful discomfort in the vulva, while other women have no symptoms. The most common symptoms include:  ?Vulvar itching - The most common symptom of LS is itching. It may be so severe that it interferes with sleep. ? Anal itching, fissures, bleeding, and pain - (See \"Patient education: Anal fissure (Beyond the Basics)\". )  ? Painful sexual intercourse (dyspareunia) - This can occur as a result of repeated cracking of the skin (fissuring) or from narrowing of the vaginal opening due to scarring. Typically, women with genital LS have thin, white, wrinkled skin on the labia, often extending down and around the anus (figure 1). Purple-colored areas of bruising may be seen. Cracks (also known as fissures) may form in the skin in the area around the anus, the labia, and the clitoris. Relatively minor rubbing or sex may lead to bleeding due to the fragility of the involved skin. Genital LS may progress and change the appearance of the genital area as the outer and inner lips of the vulva fuse (stick together) and cover the clitoris.  The opening of the vagina can become narrowed, and cracks, fissures, and thickened, scarred skin in the genital and anal area can make sexual intercourse or genital examination painful. LS does not affect the inner reproductive organs, such as the vagina and uterus. LICHEN SCLEROSUS DIAGNOSIS  Providers typically use the following methods to diagnose lichen sclerosus (LS). History and physical examination -- A medical history and physical examination of the vulvar and anal areas will be done, looking for the signs and symptoms of LS. A general skin examination may also be performed to exclude LS elsewhere on the body. Biopsy -- To confirm a suspected diagnosis of LS, a biopsy is recommended. A small piece of the affected skin will be removed and sent to a pathologist to be examined with a microscope. Excluding other conditions -- Tests may be done to exclude other conditions that could cause symptoms similar to those of LS, such as:  ?Lichen planus (a skin disease that can also cause itching and fusing of genital skin). Lichen planus can occur together with LS. ? Low estrogen level (a lack of the hormone estrogen can rarely cause fusing of genital skin but is often the cause of painful intercourse). (See \"Patient education: Vaginal dryness (Beyond the Basics)\". )  ? Vitiligo (a disorder that can cause white skin patches similar to those of LS). Vitiligo can occur together with LS. ? Pemphigoid (a blistering skin disorder that also causes scarring of the vulva) is extremely rare. ? Infections can cause similar symptoms but usually do not cause the typical skin changes of LS. However, infection can occur together with LS. LS and cancer -- Women with LS affecting the vulva are at a slightly increased risk for developing squamous cell skin cancer of the vulva. Diagnosing genital LS early, treating it effectively, and biopsying any abnormal areas may help to reduce the risk of developing or missing a diagnosis of skin cancer.  A once-yearly examination of the skin of the vulva is recommended, and women should examine themselves regularly for lumps or sores that do not heal. A biopsy should be performed if there are areas that do not improve with treatment. There is early evidence to suggest that good control of LS may reduce the risk of vulval cancer. LS lesions outside the genital area do not have an increased risk of cancer. LS and painful sexual intercourse -- LS can lead to constriction of the vaginal opening and pain during sexual intercourse. Women who experience pain during sex first require treatment to suppress any active disease. Once the disease is controlled, some clinicians may recommend an estrogen cream to help to soften the skin around the vaginal opening. Devices called vaginal dilators, which patients can use at home, also may be used to slowly stretch the skin. Pain with intercourse can also occur from other causes. Patients who notice pain during intercourse should discuss their symptoms with their health care providers. LICHEN SCLEROSUS TREATMENT  The goals of treatment of lichen sclerosus (LS) are to relieve bothersome symptoms and to prevent the condition from worsening. A clinician may recommend medication for the physical symptoms, and may refer the patient for support and therapy for other issues associated with the condition, such as problems with sex. All patients with genital LS, even those without noticeable symptoms, need to use medication on a regular and ongoing basis. Patients also should see a health care provider for reevaluation of the disease at least once or twice yearly. Patients who are diagnosed with genital LS should talk to their clinician about:  ? The lifelong and potentially progressive nature of LS; appropriate treatment can stop the condition from worsening. ?Ways to manage the condition. ? The slightly increased risk of vulvar cancer and the need for ongoing monitoring.   ?How to keep the genital area healthy and avoid scratching (table 1). ?Persistent pain with intercourse. ? Good vulval hygiene, including avoidance of irritant products (eg, soaps, douches, and body washes) and the use of a bland emollient (moisturizer). Depending on the severity of the condition, a health care provider may recommend one or more of the following treatments for genital LS:  ?Steroid ointments are recommended to reduce inflammation and itching. They are the treatment of choice for genital LS. Strong steroid ointments (eg, clobetasol propionate) are the mainstay of treatment for genital LS and are effective in most women. Initial treatment usually requires daily application of the ointment for one to three months to resolve the symptoms and reduce inflammation. After the initial course, most women require \"maintenance\" therapy with either less frequent application of the strong steroid ointment or a switch to a less potent steroid. Although there may be warnings on the product about the use of topical steroids on genital skin, it is important to use an adequate amount to bring the disease under control. The health care provider will provide guidance about the amount to use and frequency of application. ?Steroid injections, especially if steroid ointments are not effective. Another class of topical medications are the calcineurin inhibitors (eg, tacrolimus or pimecrolimus), which are sometimes prescribed for patients who respond poorly to steroids or cannot tolerate steroid treatment. Although it is not approved by the [employer] and Drug Administration (FDA) for this use, an oral medication called acitretin has also been used for the treatment of LS in some patients. Because it has many side effects, including a risk for liver damage, the drug is used primarily in patients who have not been helped by other treatments.  Acitretin can cause severe birth defects, and women should not get pregnant during treatment or for three years after taking the drug. For this reason, acitretin usually is not recommended for women of child-bearing age. Some women with LS may develop abnormal fusion of the labia and/or scarring. Vaginal dilators can be used in this situation to stretch the skin to help restore normal function. Surgery may also be used in this situation. It is important to continue medical treatment (with corticosteroids) and dilators after surgery to prevent the recurrence of scarring. Vulval pain sometimes persists despite treatment with topical steroids. Treatment approaches for this include self-massage and dilator therapy to reduce pain with sexual intercourse. Sometimes oral medications are recommended. WHAT TO EXPECT  The good news for patients who have been diagnosed with lichen sclerosus (LS) is that treatments such as topical steroid ointments are very effective. Thus, early treatment of LS with topical steroids can prevent scarring. Follow-up is important throughout the lifetime. WHERE TO GET MORE INFORMATION  Your health care provider is the best source of information for questions and concerns related to your medical problem. This article will be updated as needed on our website ([email or web address]). Related topics for patients, as well as selected articles written for health care professionals, are also available. Some of the most relevant are listed below. Patient level information -- [employer] offers two types of patient education materials. The Basics -- The Basics patient education pieces answer the four or five key questions a patient might have about a given condition. These articles are best for patients who want a general overview and who prefer short, easy-to-read materials.   Patient education: Lichen sclerosus (The Basics)  Patient education: Vulvar itching (The Basics)  Patient education: Lichen planus (The Basics)  Patient education: Vulvar pain (The Basics)  Beyond the Basics -- Beyond the Basics patient education pieces are longer, more sophisticated, and more detailed. These articles are best for patients who want in-depth information and are comfortable with some medical jargon. Patient education: Anal fissure (Beyond the Basics)  Patient education: Menopausal hormone therapy (Beyond the Basics)   Professional level information -- Professional level articles are designed to keep doctors and other health professionals up-to-date on the latest medical findings. These articles are thorough, long, and complex, and they contain multiple references to the research on which they are based. Professional level articles are best for people who are comfortable with a lot of medical terminology and who want to read the same materials their doctors are reading. Vulvar dermatitis  Vulvar lesions: Differential diagnosis of vesicles, bullae, erosions, and ulcers  Cutaneous squamous cell carcinoma: Epidemiology and risk factors  Vulvar cancer: Epidemiology, diagnosis, histopathology, and treatment  Vulvar squamous intraepithelial lesions (vulvar intraepithelial neoplasia)  Vulvar lichen planus  Vulvar lichen sclerosus: Clinical manifestations and diagnosis  Vulvar pain of unknown cause (vulvodynia): Treatment  Overview of vulvovaginal conditions in the prepubertal child  Cutaneous squamous cell carcinoma (cSCC): Clinical features and diagnosis  Extragenital lichen sclerosus: Clinical features and diagnosis  The following organizations also provide reliable health information. ? [employer]       (https://rarediseases. info.nih.gov/diseases/6905/lichen-sclerosus)  ? National Vulvodynia Association       (www.nva.org)  ? The International Society for the Study of Vulvovaginal Disease       (www.issvd. org)  ? [employer]       (www.caredownthere.com.au)  [1-9]

## 2023-11-29 LAB
M TB IFN-G CD4+ T-CELLS BLD-ACNC: 0.05 IU/ML
M TB TUBERC IFN-G BLD QL: NEGATIVE
M TB TUBERC IGNF/MITOGEN IGNF CONTROL: >10 IU/ML
QFT TB1 AG MINUS NIL: 0.04 IU/ML
QFT TB2 AG MINUS NIL: 0.04 IU/ML

## 2023-12-12 DIAGNOSIS — M05.79 RHEUMATOID ARTHRITIS INVOLVING MULTIPLE SITES WITH POSITIVE RHEUMATOID FACTOR (HCC): ICD-10-CM

## 2023-12-12 RX ORDER — MEDROXYPROGESTERONE ACETATE 150 MG/ML
50 INJECTION, SUSPENSION INTRAMUSCULAR WEEKLY
Qty: 4.2 ML | Refills: 0 | Status: SHIPPED | OUTPATIENT
Start: 2023-12-12

## 2023-12-12 NOTE — TELEPHONE ENCOUNTER
Current Outpatient Medications   Medication Sig Dispense Refill    ENBREL SURECLICK 50 MG/ML Subcutaneous Solution Auto-injector Inject 50 mg into the skin once a week.  4.2 mL 0

## 2023-12-12 NOTE — TELEPHONE ENCOUNTER
Requested Prescriptions     Pending Prescriptions Disp Refills    ENBREL SURECLICK 50 MG/ML Subcutaneous Solution Auto-injector 4.2 mL 0     Sig: Inject 50 mg into the skin once a week.      LF: 10/31/23 #4.2 ML W/ 0 RF  LOV:  10/27/23   Future Appointments   Date Time Provider [place]   12/21/2023  1:00 PM Daniel Braden MD [address]   1/30/2024  8:40 AM Maria Fernanda Montemayor MD [facility]     Labs:   Component      Latest Ref Rng 11/28/2023   WBC      4.0 - 11.0 x10(3) uL 6.1    RBC      3.80 - 5.30 x10(6)uL 4.45    Hemoglobin      12.0 - 16.0 g/dL 13.5    Hematocrit      35.0 - 48.0 % 40.5    MCV      80.0 - 100.0 fL 91.0    MCH      26.0 - 34.0 pg 30.3    MCHC      31.0 - 37.0 g/dL 33.3    RDW-SD      35.1 - 46.3 fL 41.6    RDW      11.0 - 15.0 % 12.7    Platelet Count      755.6 - 450.0 10(3)uL 351.0    Prelim Neutrophil Abs      1.50 - 7.70 x10 (3) uL 3.16    Neutrophils Absolute      1.50 - 7.70 x10(3) uL 3.16    Lymphocytes Absolute      1.00 - 4.00 x10(3) uL 2.37    Monocytes Absolute      0.10 - 1.00 x10(3) uL 0.35    Eosinophils Absolute      0.00 - 0.70 x10(3) uL 0.14    Basophils Absolute      0.00 - 0.20 x10(3) uL 0.06    Immature Granulocyte Absolute      0.00 - 1.00 x10(3) uL 0.02    Neutrophils %      % 51.8    Lymphocytes %      % 38.9    Monocytes %      % 5.7    Eosinophils %      % 2.3    Basophils %      % 1.0    Immature Granulocyte %      % 0.3    Quantiferon TB NIL      IU/mL 0.05    Quantiferon-TB1 Minus NIL      IU/mL 0.04    Quantiferon-TB2 Minus NIL      IU/mL 0.04    Quantiferon TB Mitogen minus NIL      IU/mL >10.00    Quantiferon TB Result      Negative  Negative    CREATININE      0.55 - 1.02 mg/dL 1.01    EGFR      >=60 mL/min/1.73m2 67    SED RATE      0 - 30 mm/Hr 25    Albumin      3.2 - 4.8 g/dL 4.5    ALT (SGPT)      10 - 49 U/L 16    AST (SGOT)      <=34 U/L 23    C-REACTIVE PROTEIN      <1.00 mg/dL <0.40        You were seen today for rheumatoid arthritis  You are having more joint pain but now is better on Enbrel  Plan to continue Enbrel weekly  Adding methotrexate 4 pills every 7 days and folic acid 1 pill a day  Blood work today and in 3 months  See me end of January or beginning of February

## 2024-01-11 DIAGNOSIS — M05.79 RHEUMATOID ARTHRITIS INVOLVING MULTIPLE SITES WITH POSITIVE RHEUMATOID FACTOR (HCC): ICD-10-CM

## 2024-01-11 RX ORDER — MEDROXYPROGESTERONE ACETATE 150 MG/ML
50 INJECTION, SUSPENSION INTRAMUSCULAR WEEKLY
Qty: 4 EACH | Refills: 2 | Status: SHIPPED | OUTPATIENT
Start: 2024-01-11

## 2024-01-19 DIAGNOSIS — M05.79 RHEUMATOID ARTHRITIS INVOLVING MULTIPLE SITES WITH POSITIVE RHEUMATOID FACTOR (HCC): ICD-10-CM

## 2024-01-19 DIAGNOSIS — Z51.81 ENCOUNTER FOR THERAPEUTIC DRUG MONITORING: ICD-10-CM

## 2024-01-19 RX ORDER — FOLIC ACID 1 MG/1
1 TABLET ORAL DAILY
Qty: 90 TABLET | Refills: 0 | Status: SHIPPED | OUTPATIENT
Start: 2024-01-19

## 2024-01-19 RX ORDER — METHOTREXATE 2.5 MG/1
10 TABLET ORAL WEEKLY
Qty: 52 TABLET | Refills: 0 | Status: SHIPPED | OUTPATIENT
Start: 2024-01-19

## 2024-01-19 NOTE — TELEPHONE ENCOUNTER
Current Outpatient Medications   Medication Sig Dispense Refill    methotrexate 2.5 MG Oral Tab Take 4 tablets (10 mg total) by mouth once a week. 52 tablet 0         Current Outpatient Medications   Medication Sig Dispense Refill    folic acid 1 MG Oral Tab Take 1 tablet (1 mg total) by mouth daily. 90 tablet 0

## 2024-01-19 NOTE — TELEPHONE ENCOUNTER
Requested Prescriptions     Pending Prescriptions Disp Refills    folic acid 1 MG Oral Tab 90 tablet 0     Sig: Take 1 tablet (1 mg total) by mouth daily.    methotrexate 2.5 MG Oral Tab 52 tablet 0     Sig: Take 4 tablets (10 mg total) by mouth once a week.     LOV: 10/27/23   Future Appointments   Date Time Provider Department Center   1/29/2024 11:15 AM Anna Molina MD [facility] 10 [facility]   1/30/2024  8:40 AM Smita Heck MD [facility]     Labs:   Component      Latest Ref Rn 11/28/2023   WBC      4.0 - 11.0 x10(3) uL 6.1    RBC      3.80 - 5.30 x10(6)uL 4.45    Hemoglobin      12.0 - 16.0 g/dL 13.5    Hematocrit      35.0 - 48.0 % 40.5    MCV      80.0 - 100.0 fL 91.0    MCH      26.0 - 34.0 pg 30.3    MCHC      31.0 - 37.0 g/dL 33.3    RDW-SD      35.1 - 46.3 fL 41.6    RDW      11.0 - 15.0 % 12.7    Platelet Count      150.0 - 450.0 10(3)uL 351.0    Prelim Neutrophil Abs      1.50 - 7.70 x10 (3) uL 3.16    Neutrophils Absolute      1.50 - 7.70 x10(3) uL 3.16    Lymphocytes Absolute      1.00 - 4.00 x10(3) uL 2.37    Monocytes Absolute      0.10 - 1.00 x10(3) uL 0.35    Eosinophils Absolute      0.00 - 0.70 x10(3) uL 0.14    Basophils Absolute      0.00 - 0.20 x10(3) uL 0.06    Immature Granulocyte Absolute      0.00 - 1.00 x10(3) uL 0.02    Neutrophils %      % 51.8    Lymphocytes %      % 38.9    Monocytes %      % 5.7    Eosinophils %      % 2.3    Basophils %      % 1.0    Immature Granulocyte %      % 0.3    Quantiferon TB NIL      IU/mL 0.05    Quantiferon-TB1 Minus NIL      IU/mL 0.04    Quantiferon-TB2 Minus NIL      IU/mL 0.04    Quantiferon TB Mitogen minus NIL      IU/mL >10.00    Quantiferon TB Result      Negative  Negative    CREATININE      0.55 - 1.02 mg/dL 1.01    EGFR      >=60 mL/min/1.73m2 67    SED RATE      0 - 30 mm/Hr 25    Albumin      3.2 - 4.8 g/dL 4.5    ALT (SGPT)      10 - 49 U/L 16    AST (SGOT)      <=34 U/L 23    C-REACTIVE PROTEIN      <1.00 mg/dL <0.40           You were seen today for rheumatoid arthritis  You are having more joint pain but now is better on Enbrel  Plan to continue Enbrel weekly  Adding methotrexate 4 pills every 7 days and folic acid 1 pill a day  Blood work today and in 3 months  See me end of January or beginning of February

## 2024-01-29 ENCOUNTER — OFFICE VISIT (OUTPATIENT)
Dept: OBGYN CLINIC | Facility: CLINIC | Age: 54
End: 2024-01-29
Payer: COMMERCIAL

## 2024-01-29 VITALS
HEIGHT: 64 IN | BODY MASS INDEX: 24.89 KG/M2 | SYSTOLIC BLOOD PRESSURE: 110 MMHG | DIASTOLIC BLOOD PRESSURE: 72 MMHG | WEIGHT: 145.81 LBS

## 2024-01-29 DIAGNOSIS — N90.89 VULVAR LESION: Primary | ICD-10-CM

## 2024-01-29 PROCEDURE — 88305 TISSUE EXAM BY PATHOLOGIST: CPT | Performed by: OBSTETRICS & GYNECOLOGY

## 2024-01-29 RX ORDER — TRIAMCINOLONE ACETONIDE 1 MG/G
1 OINTMENT TOPICAL 2 TIMES DAILY PRN
Qty: 15 G | Refills: 1 | Status: SHIPPED | OUTPATIENT
Start: 2024-01-29

## 2024-01-29 NOTE — PROCEDURES
Vulvar Biopsy - Procedure Note    Procedure: Vulvar biopsy  Indication: Vulvar lesions  Vulva - lesions  - 5 mm skin tag right labia majora  Previously seen white lesion on inner labia minora healed      Procedure: Risks, benefits and alternatives to procedure were discussed with patient. Informed consent was obtained. Time out was performed just prior to initiation of procedure.   Area was prepped with Betadine and sterile technique was employed. 1 cc 1% lidocaine was injected subdermally.   An 11 blade scalpel was used to shave the lesions at the chosen site. The bases were cauterized with silver nitrate. Excellent hemostasis was obtained.   Antibiotic ointment and a bandage was placed over the biopsy site.    The patient tolerated the procedure well. No complications noted.    Anna Molina MD    Patient reports that she has been using clobetasol daily on left minora due to itching. No new lesion. Prescription triamcinolone sent to use as needed.

## 2024-01-30 ENCOUNTER — OFFICE VISIT (OUTPATIENT)
Dept: RHEUMATOLOGY | Facility: CLINIC | Age: 54
End: 2024-01-30
Payer: COMMERCIAL

## 2024-01-30 VITALS
HEIGHT: 64 IN | SYSTOLIC BLOOD PRESSURE: 108 MMHG | DIASTOLIC BLOOD PRESSURE: 73 MMHG | BODY MASS INDEX: 25.44 KG/M2 | HEART RATE: 73 BPM | WEIGHT: 149 LBS

## 2024-01-30 DIAGNOSIS — Z51.81 ENCOUNTER FOR THERAPEUTIC DRUG MONITORING: Primary | ICD-10-CM

## 2024-01-30 DIAGNOSIS — M05.79 RHEUMATOID ARTHRITIS INVOLVING MULTIPLE SITES WITH POSITIVE RHEUMATOID FACTOR (HCC): ICD-10-CM

## 2024-01-30 PROCEDURE — 3008F BODY MASS INDEX DOCD: CPT | Performed by: INTERNAL MEDICINE

## 2024-01-30 PROCEDURE — 3074F SYST BP LT 130 MM HG: CPT | Performed by: INTERNAL MEDICINE

## 2024-01-30 PROCEDURE — 99214 OFFICE O/P EST MOD 30 MIN: CPT | Performed by: INTERNAL MEDICINE

## 2024-01-30 PROCEDURE — 3078F DIAST BP <80 MM HG: CPT | Performed by: INTERNAL MEDICINE

## 2024-01-30 RX ORDER — MEDROXYPROGESTERONE ACETATE 150 MG/ML
50 INJECTION, SUSPENSION INTRAMUSCULAR WEEKLY
Qty: 4 EACH | Refills: 5 | Status: SHIPPED | OUTPATIENT
Start: 2024-01-30

## 2024-01-30 NOTE — PROGRESS NOTES
Rebekah Ramos is a 53 year old female.    HPI:     Chief Complaint   Patient presents with    Follow - Up    Rheumatoid Arthritis     Ophthalmogy Dr. Jamal Sow at [facility]     I had the pleasure of seeing Rebekah Ramos on 1/30/2024 for follow up Seropositive RA     Current medications:  Enbrel weekly- started 2014  Methotrexate 4 pills weekly- started Nov 2023  Previous medications:  Methotrexate- [id number]  Blood work:  RF 72, CCP>250    Interval History:  This is a 52 yo F with hx of Hypothyroid, GERD, Lichen sclerosis, Depression and Seropositive RA presents to establish care for RA.  She was diagnosed in 2009 when she presented with symmetrical joint pain and swelling involving her hands, wrists, shoulders and feet.  She was initially placed on methotrexate titrated up to 10 pills weekly.  She then moved to [place] and was placed on Enbrel weekly in 2014.  She stopped methotrexate.  She has been doing well on Enbrel.  She lost her insurance and was off Enbrel for about 1.5-month from February to March.  She started to experience some more joint pain involving her hands.  Now back on Enbrel.  Having some stiffness in her left index finger.  Otherwise no other joint pain or swelling.  No history of psoriasis.  No lower back pain.     10/27/2023:  Presents for f/u of Seropositive RA  On Enbrel now for the past 3 mos, was off of it due to moving   Was not doing well up until Saturday in hands and feet, swelling in the fingers and stiffness   Still has some stiffness in the pinky's and the left index finger, mild swelling  Was on methotrexate previously and wants to try it again. Also Lichen sclerosis has worsened off methotrexate   Was seen by eye doctor for left eye pain and was seen by ophthalmology and was told there was inflammation from RA    1/30/2024:  Presents for follow-up of seropositive RA  On Enbrel weekly.  She is having some more joint pain during her last visit involving her hands, now on methotrexate  4 pills weekly.  Joint pain is improved.  At times she will have some stiffness and pain and swelling her left index finger but overall stable.  Her lichen sclerosis also has improved on methotrexate  She will be seeing the ophthalmologist soon, she was on prednisone eyedrops for inflammation in the eyes      HISTORY:  Past Medical History:   Diagnosis Date    Depression     Disorder of thyroid     Hypothyroidism     Rheumatoid arthritis (HCC)       Social Hx Reviewed   Family Hx Reviewed     Medications (Active prior to today's visit):  Current Outpatient Medications   Medication Sig Dispense Refill    triamcinolone 0.1 % External Ointment Apply 1 Application topically 2 (two) times daily as needed. 15 g 1    folic acid 1 MG Oral Tab Take 1 tablet (1 mg total) by mouth daily. 90 tablet 0    methotrexate 2.5 MG Oral Tab Take 4 tablets (10 mg total) by mouth once a week. 52 tablet 0    ENBREL SURECLICK 50 MG/ML Subcutaneous Solution Auto-injector Inject 50 mg into the skin once a week. 4 each 2    levothyroxine 88 MCG Oral Tab Take 1 tablet (88 mcg total) by mouth before breakfast. 90 tablet 3    citalopram 20 MG Oral Tab Take 1 tablet (20 mg total) by mouth daily. 90 tablet 2    cycloSPORINE 0.05 % Ophthalmic Emulsion Place 1 drop into both eyes every 12 (twelve) hours.      clobetasol 0.05 % External Cream Apply 1 Application topically in the morning and 1 Application before bedtime.      methylPREDNISolone 4 MG Oral Tablet Therapy Pack Take as directed on package. 1 each 0     .cmed  Allergies:  No Known Allergies      ROS:   All other ROS are negative.     PHYSICAL EXAM:   GEN: AAOx3, NAD  HEENT: EOMI, PERRLA, no injection or icterus, oral mucosa moist, no oral lesions. No lymphadenopathy. No facial rash  CVS: RRR, no murmurs rubs or gallops. Equal 2+ distal pulses.   LUNGS: CTAB, no increased work of breathing  ABDOMEN:  soft NT/ND, +BS, no HSM  SKIN: No rashes or skin lesions. No nail findings  MSK:  Cervical  spine: FROM  Hands: no synovitis in DIP, PIP and MCP, strong full fists  Wrist: FROM, no pain or swelling or warmth on palpation  Elbow: FROM, no pain or swelling or warmth on palpation  Shoulders: FROM, no pain or swelling or warmth on palpation  Hip: normal log roll, no lateral hip pain, ORAL test negative b/l  Knees: FROM, no warmth or effusion present. No pain with ROM.   Ankles: FROM, no pain or swelling or warmth on palpation  Feet: no pain with MTP squeeze, no toe swelling or pain or warmth on palpation with FROM  Spine: no lumbar or sacral pain on palpation.  NEURO: Cranial nerves II-XII intact grossly. 5/5 strength throughout in both upper and lower extremities, sensation intact.  PSYCH: normal mood       LABS:     Component      Latest Ref Rn 10/27/2023   WBC      4.0 - 11.0 x10(3) uL 8.0    RBC      3.80 - 5.30 x10(6)uL 4.38    Hemoglobin      12.0 - 16.0 g/dL 13.7    Hematocrit      35.0 - 48.0 % 40.4    MCV      80.0 - 100.0 fL 92.2    MCH      26.0 - 34.0 pg 31.3    MCHC      31.0 - 37.0 g/dL 33.9    RDW-SD      35.1 - 46.3 fL 41.7    RDW      11.0 - 15.0 % 12.2    Platelet Count      150.0 - 450.0 10(3)uL 387.0    Prelim Neutrophil Abs      1.50 - 7.70 x10 (3) uL 4.23    Neutrophils Absolute      1.50 - 7.70 x10(3) uL 4.23    Lymphocytes Absolute      1.00 - 4.00 x10(3) uL 2.89    Monocytes Absolute      0.10 - 1.00 x10(3) uL 0.57    Eosinophils Absolute      0.00 - 0.70 x10(3) uL 0.19    Basophils Absolute      0.00 - 0.20 x10(3) uL 0.06    Immature Granulocyte Absolute      0.00 - 1.00 x10(3) uL 0.02    Neutrophils %      % 53.0    Lymphocytes %      % 36.3    Monocytes %      % 7.2    Eosinophils %      % 2.4    Basophils %      % 0.8    Immature Granulocyte %      % 0.3    CREATININE      0.55 - 1.02 mg/dL 0.89    EGFR      >=60 mL/min/1.73m2 78    Albumin      3.4 - 5.0 g/dL 3.9    ALT (SGPT)      13 - 56 U/L 19    AST (SGOT)      15 - 37 U/L 18    C-REACTIVE PROTEIN      <0.30 mg/dL 0.93 (H)     SED RATE      0 - 30 mm/Hr 22      Imaging:     XR hands 2013:  There are no signs of acute trauma or is degenerative change. There is some very minimal juxtaarticular osteoporosis, but there is no evidence of joint soft-tissue swelling or marginal erosion.      ASSESSMENT/PLAN:     Seropositive RA- stable  - Found to have + RF and CCP.  No erosions on x-rays  - Currently on Enbrel weekly.  Recently added methotrexate due to recurrent pain and swelling in her hands.  Symptoms have improved  - She will continue Enbrel weekly methotrexate 4 pills weekly and folic acid daily  - Blood work in November was normal  - She will repeat blood work next month and continue to monitor every 3 to 4 months    Lichen sclerosis  - Now on methotrexate and this is improved    Pt will f/u in 3-4 mos     Smita Heck MD  1/30/2024  8:40 AM

## 2024-03-04 ENCOUNTER — LAB ENCOUNTER (OUTPATIENT)
Dept: LAB | Facility: HOSPITAL | Age: 54
End: 2024-03-04
Attending: INTERNAL MEDICINE
Payer: COMMERCIAL

## 2024-03-04 DIAGNOSIS — Z51.81 ENCOUNTER FOR THERAPEUTIC DRUG MONITORING: ICD-10-CM

## 2024-03-04 DIAGNOSIS — M05.79 RHEUMATOID ARTHRITIS INVOLVING MULTIPLE SITES WITH POSITIVE RHEUMATOID FACTOR (HCC): ICD-10-CM

## 2024-03-04 LAB
ALBUMIN SERPL-MCNC: 4.6 G/DL (ref 3.2–4.8)
ALT SERPL-CCNC: 41 U/L
AST SERPL-CCNC: 45 U/L (ref ?–34)
BASOPHILS # BLD AUTO: 0.06 X10(3) UL (ref 0–0.2)
BASOPHILS NFR BLD AUTO: 0.8 %
CREAT BLD-MCNC: 0.96 MG/DL
CRP SERPL-MCNC: <0.4 MG/DL (ref ?–1)
DEPRECATED RDW RBC AUTO: 43.2 FL (ref 35.1–46.3)
EGFRCR SERPLBLD CKD-EPI 2021: 71 ML/MIN/1.73M2 (ref 60–?)
EOSINOPHIL # BLD AUTO: 0.16 X10(3) UL (ref 0–0.7)
EOSINOPHIL NFR BLD AUTO: 2.2 %
ERYTHROCYTE [DISTWIDTH] IN BLOOD BY AUTOMATED COUNT: 12.8 % (ref 11–15)
ERYTHROCYTE [SEDIMENTATION RATE] IN BLOOD: 12 MM/HR
HCT VFR BLD AUTO: 38 %
HGB BLD-MCNC: 12.9 G/DL
IMM GRANULOCYTES # BLD AUTO: 0.02 X10(3) UL (ref 0–1)
IMM GRANULOCYTES NFR BLD: 0.3 %
LYMPHOCYTES # BLD AUTO: 3.48 X10(3) UL (ref 1–4)
LYMPHOCYTES NFR BLD AUTO: 47.6 %
MCH RBC QN AUTO: 31.2 PG (ref 26–34)
MCHC RBC AUTO-ENTMCNC: 33.9 G/DL (ref 31–37)
MCV RBC AUTO: 91.8 FL
MONOCYTES # BLD AUTO: 0.56 X10(3) UL (ref 0.1–1)
MONOCYTES NFR BLD AUTO: 7.7 %
NEUTROPHILS # BLD AUTO: 3.03 X10 (3) UL (ref 1.5–7.7)
NEUTROPHILS # BLD AUTO: 3.03 X10(3) UL (ref 1.5–7.7)
NEUTROPHILS NFR BLD AUTO: 41.4 %
PLATELET # BLD AUTO: 359 10(3)UL (ref 150–450)
RBC # BLD AUTO: 4.14 X10(6)UL
WBC # BLD AUTO: 7.3 X10(3) UL (ref 4–11)

## 2024-03-04 PROCEDURE — 85025 COMPLETE CBC W/AUTO DIFF WBC: CPT

## 2024-03-04 PROCEDURE — 84460 ALANINE AMINO (ALT) (SGPT): CPT

## 2024-03-04 PROCEDURE — 82040 ASSAY OF SERUM ALBUMIN: CPT

## 2024-03-04 PROCEDURE — 84450 TRANSFERASE (AST) (SGOT): CPT

## 2024-03-04 PROCEDURE — 82565 ASSAY OF CREATININE: CPT

## 2024-03-04 PROCEDURE — 86140 C-REACTIVE PROTEIN: CPT

## 2024-03-04 PROCEDURE — 85652 RBC SED RATE AUTOMATED: CPT

## 2024-03-04 PROCEDURE — 36415 COLL VENOUS BLD VENIPUNCTURE: CPT

## 2024-03-07 ENCOUNTER — PATIENT MESSAGE (OUTPATIENT)
Dept: OBGYN CLINIC | Facility: CLINIC | Age: 54
End: 2024-03-07

## 2024-03-07 DIAGNOSIS — N90.4 LICHEN SCLEROSUS OF VULVA: Primary | ICD-10-CM

## 2024-03-08 RX ORDER — TRIAMCINOLONE ACETONIDE 1 MG/G
1 OINTMENT TOPICAL NIGHTLY
Qty: 15 G | Refills: 2 | Status: SHIPPED | OUTPATIENT
Start: 2024-03-08

## 2024-03-23 ENCOUNTER — PATIENT MESSAGE (OUTPATIENT)
Dept: RHEUMATOLOGY | Facility: CLINIC | Age: 54
End: 2024-03-23

## 2024-03-25 NOTE — TELEPHONE ENCOUNTER
From: Rebekah Ramos  To: Smita Heck  Sent: 3/23/2024 6:11 PM CDT  Subject: Eye condition relayed to RA    Dr Nevarez    You asked for the name of my eye condition. Per Dr. Sow [phone], it is:    Keratoconjunctivitis sicca, not specified as Sjogren's bilateral.     I hope that answers your question.

## 2024-03-31 DIAGNOSIS — M05.79 RHEUMATOID ARTHRITIS INVOLVING MULTIPLE SITES WITH POSITIVE RHEUMATOID FACTOR (HCC): ICD-10-CM

## 2024-04-01 RX ORDER — MEDROXYPROGESTERONE ACETATE 150 MG/ML
50 INJECTION, SUSPENSION INTRAMUSCULAR WEEKLY
Qty: 4 EACH | Refills: 5 | OUTPATIENT
Start: 2024-04-01

## 2024-04-07 DIAGNOSIS — Z51.81 ENCOUNTER FOR THERAPEUTIC DRUG MONITORING: ICD-10-CM

## 2024-04-07 DIAGNOSIS — M05.79 RHEUMATOID ARTHRITIS INVOLVING MULTIPLE SITES WITH POSITIVE RHEUMATOID FACTOR (HCC): ICD-10-CM

## 2024-04-08 ENCOUNTER — TELEPHONE (OUTPATIENT)
Dept: RHEUMATOLOGY | Facility: CLINIC | Age: 54
End: 2024-04-08

## 2024-04-08 RX ORDER — METHOTREXATE 2.5 MG/1
10 TABLET ORAL WEEKLY
Qty: 52 TABLET | Refills: 0 | Status: SHIPPED | OUTPATIENT
Start: 2024-04-08

## 2024-04-08 NOTE — TELEPHONE ENCOUNTER
Requested Prescriptions     Pending Prescriptions Disp Refills    METHOTREXATE 2.5 MG Oral Tab [Pharmacy Med Name: METHOTREXATE 2.5 MG TABLET] 52 tablet 0     Sig: TAKE 4 TABLETS (10 MG TOTAL) BY MOUTH ONCE A WEEK     No future appointments.  LOV: 1/30/24  Last Refilled:1/19/24 #52 0RF   Labs:  Component      Latest Ref Rng 3/4/2024   WBC      4.0 - 11.0 x10(3) uL 7.3    RBC      3.80 - 5.30 x10(6)uL 4.14    Hemoglobin      12.0 - 16.0 g/dL 12.9    Hematocrit      35.0 - 48.0 % 38.0    MCV      80.0 - 100.0 fL 91.8    MCH      26.0 - 34.0 pg 31.2    MCHC      31.0 - 37.0 g/dL 33.9    RDW-SD      35.1 - 46.3 fL 43.2    RDW      11.0 - 15.0 % 12.8    Platelet Count      150.0 - 450.0 10(3)uL 359.0    Prelim Neutrophil Abs      1.50 - 7.70 x10 (3) uL 3.03    Neutrophils Absolute      1.50 - 7.70 x10(3) uL 3.03    Lymphocytes Absolute      1.00 - 4.00 x10(3) uL 3.48    Monocytes Absolute      0.10 - 1.00 x10(3) uL 0.56    Eosinophils Absolute      0.00 - 0.70 x10(3) uL 0.16    Basophils Absolute      0.00 - 0.20 x10(3) uL 0.06    Immature Granulocyte Absolute      0.00 - 1.00 x10(3) uL 0.02    Neutrophils %      % 41.4    Lymphocytes %      % 47.6    Monocytes %      % 7.7    Eosinophils %      % 2.2    Basophils %      % 0.8    Immature Granulocyte %      % 0.3    CREATININE      0.55 - 1.02 mg/dL 0.96    EGFR      >=60 mL/min/1.73m2 71    SED RATE      0 - 30 mm/Hr 12    C-REACTIVE PROTEIN      <1.00 mg/dL <0.40    AST (SGOT)      <=34 U/L 45 (H)    ALT (SGPT)      10 - 49 U/L 41    Albumin      3.2 - 4.8 g/dL 4.6       Legend:  (H) High    ASSESSMENT/PLAN:      Seropositive RA- stable  - Found to have + RF and CCP.  No erosions on x-rays  - Currently on Enbrel weekly.  Recently added methotrexate due to recurrent pain and swelling in her hands.  Symptoms have improved  - She will continue Enbrel weekly methotrexate 4 pills weekly and folic acid daily  - Blood work in November was normal  - She will repeat blood work  next month and continue to monitor every 3 to 4 months     Lichen sclerosis  - Now on methotrexate and this is improved     Pt will f/u in 3-4 mos      Smita Heck MD  1/30/2024  8:40 AM

## 2024-04-08 NOTE — TELEPHONE ENCOUNTER
PA Approved    Prior authorization for: Enbrel    Medication form: 50mg pen    Date received: 4/8    Approval #: CaseId:[id number]    Approved dates:  Coverage Start Date:03/09/2024  Coverage End Date:04/08/2025    Pharmacy for medication: [facility]    QF-TB results: negative 11/28/23

## 2024-04-08 NOTE — TELEPHONE ENCOUNTER
PA start    Prior authorization for: Enbrel    Medication form: pen    Submission method: covermymeds    Spoke with (if by phone):     Date submitted: 4/8    Tracking #: Key: [id number]     QF-TB result: negative 11/28/23

## 2024-04-08 NOTE — TELEPHONE ENCOUNTER
Patient Enbrel  on 3/2024. Please advise.    Anum Zapata RN         23  2:40 PM  Note  Per S[employer]s: Prior Authorization duplicate/approved  Notes  An active PA is already on file with expiration date of 2024. Please wait to resubmit request within 60 days of that expiration date to obtain a PA renewal.

## 2024-04-09 ENCOUNTER — PATIENT MESSAGE (OUTPATIENT)
Dept: RHEUMATOLOGY | Facility: CLINIC | Age: 54
End: 2024-04-09

## 2024-04-09 NOTE — TELEPHONE ENCOUNTER
From: Rebekah Ramos  To: Smita Heck  Sent: 4/9/2024 11:39 AM CDT  Subject: Enbrel refill    I gave had nothing but problems getting a refill. The have thr PA, thank you. Now they are saying I need a new prescription. I realized Dr. Nevarez sent a five months prescription in January but they are saying I need a new one.     Can you please help me? I've been on the phone with the pharmacy 5 times over the last several days. Thank you.

## 2024-04-09 NOTE — TELEPHONE ENCOUNTER
Spoke with [facility]. They do not need new script. There was confusion on their end on the last prescription and the need for a new PA. They have ran a test clam. Co-pay is over $300 but they have a co-pay card on file. They will reach out to the patient in 24-48 hours to fill the medication after pharmacist review.

## 2024-04-17 DIAGNOSIS — Z51.81 ENCOUNTER FOR THERAPEUTIC DRUG MONITORING: ICD-10-CM

## 2024-04-17 DIAGNOSIS — M05.79 RHEUMATOID ARTHRITIS INVOLVING MULTIPLE SITES WITH POSITIVE RHEUMATOID FACTOR (HCC): ICD-10-CM

## 2024-04-17 RX ORDER — FOLIC ACID 1 MG/1
1 TABLET ORAL DAILY
Qty: 90 TABLET | Refills: 0 | Status: SHIPPED | OUTPATIENT
Start: 2024-04-17

## 2024-04-17 NOTE — TELEPHONE ENCOUNTER
LOV: 1/30/24  Last Refilled:#90, 0rfs 1/19/24    ASSESSMENT/PLAN:      Seropositive RA- stable  - Found to have + RF and CCP.  No erosions on x-rays  - Currently on Enbrel weekly.  Recently added methotrexate due to recurrent pain and swelling in her hands.  Symptoms have improved  - She will continue Enbrel weekly methotrexate 4 pills weekly and folic acid daily  - Blood work in November was normal  - She will repeat blood work next month and continue to monitor every 3 to 4 months     Lichen sclerosis  - Now on methotrexate and this is improved     Pt will f/u in 3-4 mos      Smita Heck MD  1/30/2024  Please advise.

## 2024-04-20 ENCOUNTER — LAB ENCOUNTER (OUTPATIENT)
Dept: LAB | Facility: HOSPITAL | Age: 54
End: 2024-04-20
Attending: INTERNAL MEDICINE
Payer: COMMERCIAL

## 2024-04-20 DIAGNOSIS — Z51.81 ENCOUNTER FOR THERAPEUTIC DRUG MONITORING: ICD-10-CM

## 2024-04-20 DIAGNOSIS — M05.79 RHEUMATOID ARTHRITIS INVOLVING MULTIPLE SITES WITH POSITIVE RHEUMATOID FACTOR (HCC): ICD-10-CM

## 2024-04-20 LAB
ALBUMIN SERPL-MCNC: 4.4 G/DL (ref 3.2–4.8)
ALT SERPL-CCNC: 16 U/L
AST SERPL-CCNC: 19 U/L (ref ?–34)
BASOPHILS # BLD AUTO: 0.05 X10(3) UL (ref 0–0.2)
BASOPHILS NFR BLD AUTO: 0.9 %
CREAT BLD-MCNC: 0.99 MG/DL
CRP SERPL-MCNC: <0.4 MG/DL (ref ?–1)
DEPRECATED RDW RBC AUTO: 41.6 FL (ref 35.1–46.3)
EGFRCR SERPLBLD CKD-EPI 2021: 68 ML/MIN/1.73M2 (ref 60–?)
EOSINOPHIL # BLD AUTO: 0.15 X10(3) UL (ref 0–0.7)
EOSINOPHIL NFR BLD AUTO: 2.7 %
ERYTHROCYTE [DISTWIDTH] IN BLOOD BY AUTOMATED COUNT: 12.5 % (ref 11–15)
ERYTHROCYTE [SEDIMENTATION RATE] IN BLOOD: 22 MM/HR
HCT VFR BLD AUTO: 39.8 %
HGB BLD-MCNC: 13.5 G/DL
IMM GRANULOCYTES # BLD AUTO: 0.01 X10(3) UL (ref 0–1)
IMM GRANULOCYTES NFR BLD: 0.2 %
LYMPHOCYTES # BLD AUTO: 2.56 X10(3) UL (ref 1–4)
LYMPHOCYTES NFR BLD AUTO: 45.7 %
MCH RBC QN AUTO: 31 PG (ref 26–34)
MCHC RBC AUTO-ENTMCNC: 33.9 G/DL (ref 31–37)
MCV RBC AUTO: 91.3 FL
MONOCYTES # BLD AUTO: 0.54 X10(3) UL (ref 0.1–1)
MONOCYTES NFR BLD AUTO: 9.6 %
NEUTROPHILS # BLD AUTO: 2.29 X10 (3) UL (ref 1.5–7.7)
NEUTROPHILS # BLD AUTO: 2.29 X10(3) UL (ref 1.5–7.7)
NEUTROPHILS NFR BLD AUTO: 40.9 %
PLATELET # BLD AUTO: 351 10(3)UL (ref 150–450)
RBC # BLD AUTO: 4.36 X10(6)UL
WBC # BLD AUTO: 5.6 X10(3) UL (ref 4–11)

## 2024-04-20 PROCEDURE — 85652 RBC SED RATE AUTOMATED: CPT

## 2024-04-20 PROCEDURE — 84460 ALANINE AMINO (ALT) (SGPT): CPT

## 2024-04-20 PROCEDURE — 85025 COMPLETE CBC W/AUTO DIFF WBC: CPT

## 2024-04-20 PROCEDURE — 82565 ASSAY OF CREATININE: CPT

## 2024-04-20 PROCEDURE — 82040 ASSAY OF SERUM ALBUMIN: CPT

## 2024-04-20 PROCEDURE — 36415 COLL VENOUS BLD VENIPUNCTURE: CPT

## 2024-04-20 PROCEDURE — 86140 C-REACTIVE PROTEIN: CPT

## 2024-04-20 PROCEDURE — 84450 TRANSFERASE (AST) (SGOT): CPT

## 2024-06-30 DIAGNOSIS — M05.79 RHEUMATOID ARTHRITIS INVOLVING MULTIPLE SITES WITH POSITIVE RHEUMATOID FACTOR (HCC): ICD-10-CM

## 2024-06-30 DIAGNOSIS — Z51.81 ENCOUNTER FOR THERAPEUTIC DRUG MONITORING: ICD-10-CM

## 2024-07-01 RX ORDER — METHOTREXATE 2.5 MG/1
10 TABLET ORAL WEEKLY
Qty: 52 TABLET | Refills: 0 | Status: SHIPPED | OUTPATIENT
Start: 2024-07-01

## 2024-07-01 NOTE — TELEPHONE ENCOUNTER
LOV: 1/30/24  Last Refilled:#52, 0rfs 4/8/24  Labs:AST 19  ALT 16  4/20/24  ASSESSMENT/PLAN:      Seropositive RA- stable  - Found to have + RF and CCP.  No erosions on x-rays  - Currently on Enbrel weekly.  Recently added methotrexate due to recurrent pain and swelling in her hands.  Symptoms have improved  - She will continue Enbrel weekly methotrexate 4 pills weekly and folic acid daily  - Blood work in November was normal  - She will repeat blood work next month and continue to monitor every 3 to 4 months     Lichen sclerosis  - Now on methotrexate and this is improved     Pt will f/u in 3-4 mos      Smita Heck MD  1/30/2024  8:40 AM  Please advise.

## 2024-07-13 ENCOUNTER — LAB ENCOUNTER (OUTPATIENT)
Dept: LAB | Facility: HOSPITAL | Age: 54
End: 2024-07-13
Attending: INTERNAL MEDICINE
Payer: COMMERCIAL

## 2024-07-13 DIAGNOSIS — Z51.81 ENCOUNTER FOR THERAPEUTIC DRUG MONITORING: ICD-10-CM

## 2024-07-13 DIAGNOSIS — M05.79 RHEUMATOID ARTHRITIS INVOLVING MULTIPLE SITES WITH POSITIVE RHEUMATOID FACTOR (HCC): ICD-10-CM

## 2024-07-13 LAB
ALBUMIN SERPL-MCNC: 4.4 G/DL (ref 3.2–4.8)
ALT SERPL-CCNC: 20 U/L
AST SERPL-CCNC: 21 U/L (ref ?–34)
BASOPHILS # BLD AUTO: 0.05 X10(3) UL (ref 0–0.2)
BASOPHILS NFR BLD AUTO: 1 %
CREAT BLD-MCNC: 0.89 MG/DL
CRP SERPL-MCNC: <0.4 MG/DL (ref ?–1)
DEPRECATED RDW RBC AUTO: 44.7 FL (ref 35.1–46.3)
EGFRCR SERPLBLD CKD-EPI 2021: 77 ML/MIN/1.73M2 (ref 60–?)
EOSINOPHIL # BLD AUTO: 0.15 X10(3) UL (ref 0–0.7)
EOSINOPHIL NFR BLD AUTO: 2.9 %
ERYTHROCYTE [DISTWIDTH] IN BLOOD BY AUTOMATED COUNT: 13.2 % (ref 11–15)
ERYTHROCYTE [SEDIMENTATION RATE] IN BLOOD: 13 MM/HR
HCT VFR BLD AUTO: 37.9 %
HGB BLD-MCNC: 12.7 G/DL
IMM GRANULOCYTES # BLD AUTO: 0.01 X10(3) UL (ref 0–1)
IMM GRANULOCYTES NFR BLD: 0.2 %
LYMPHOCYTES # BLD AUTO: 2.51 X10(3) UL (ref 1–4)
LYMPHOCYTES NFR BLD AUTO: 48.5 %
MCH RBC QN AUTO: 31.1 PG (ref 26–34)
MCHC RBC AUTO-ENTMCNC: 33.5 G/DL (ref 31–37)
MCV RBC AUTO: 92.9 FL
MONOCYTES # BLD AUTO: 0.52 X10(3) UL (ref 0.1–1)
MONOCYTES NFR BLD AUTO: 10 %
NEUTROPHILS # BLD AUTO: 1.94 X10 (3) UL (ref 1.5–7.7)
NEUTROPHILS # BLD AUTO: 1.94 X10(3) UL (ref 1.5–7.7)
NEUTROPHILS NFR BLD AUTO: 37.4 %
PLATELET # BLD AUTO: 349 10(3)UL (ref 150–450)
RBC # BLD AUTO: 4.08 X10(6)UL
WBC # BLD AUTO: 5.2 X10(3) UL (ref 4–11)

## 2024-07-13 PROCEDURE — 86140 C-REACTIVE PROTEIN: CPT

## 2024-07-13 PROCEDURE — 84460 ALANINE AMINO (ALT) (SGPT): CPT

## 2024-07-13 PROCEDURE — 82040 ASSAY OF SERUM ALBUMIN: CPT

## 2024-07-13 PROCEDURE — 85652 RBC SED RATE AUTOMATED: CPT

## 2024-07-13 PROCEDURE — 85025 COMPLETE CBC W/AUTO DIFF WBC: CPT

## 2024-07-13 PROCEDURE — 36415 COLL VENOUS BLD VENIPUNCTURE: CPT

## 2024-07-13 PROCEDURE — 84450 TRANSFERASE (AST) (SGOT): CPT

## 2024-07-13 PROCEDURE — 82565 ASSAY OF CREATININE: CPT

## 2024-07-17 DIAGNOSIS — M05.79 RHEUMATOID ARTHRITIS INVOLVING MULTIPLE SITES WITH POSITIVE RHEUMATOID FACTOR (HCC): ICD-10-CM

## 2024-07-17 DIAGNOSIS — Z51.81 ENCOUNTER FOR THERAPEUTIC DRUG MONITORING: ICD-10-CM

## 2024-07-17 RX ORDER — FOLIC ACID 1 MG/1
1 TABLET ORAL DAILY
Qty: 90 TABLET | Refills: 0 | Status: SHIPPED | OUTPATIENT
Start: 2024-07-17

## 2024-07-17 NOTE — TELEPHONE ENCOUNTER
Requested Prescriptions     Pending Prescriptions Disp Refills    FOLIC ACID 1 MG Oral Tab [Pharmacy Med Name: FOLIC ACID 1 MG TABLET] 90 tablet 0     Sig: TAKE 1 TABLET BY MOUTH EVERY DAY     No future appointments.    LOV: 1/30/24  Last Refilled:4/17/24 #90 0RF  Labs:    Component      Latest Ref Rng 7/13/2024   WBC      4.0 - 11.0 x10(3) uL 5.2    RBC      3.80 - 5.30 x10(6)uL 4.08    Hemoglobin      12.0 - 16.0 g/dL 12.7    Hematocrit      35.0 - 48.0 % 37.9    MCV      80.0 - 100.0 fL 92.9    MCH      26.0 - 34.0 pg 31.1    MCHC      31.0 - 37.0 g/dL 33.5    RDW-SD      35.1 - 46.3 fL 44.7    RDW      11.0 - 15.0 % 13.2    Platelet Count      150.0 - 450.0 10(3)uL 349.0    Prelim Neutrophil Abs      1.50 - 7.70 x10 (3) uL 1.94    Neutrophils Absolute      1.50 - 7.70 x10(3) uL 1.94    Lymphocytes Absolute      1.00 - 4.00 x10(3) uL 2.51    Monocytes Absolute      0.10 - 1.00 x10(3) uL 0.52    Eosinophils Absolute      0.00 - 0.70 x10(3) uL 0.15    Basophils Absolute      0.00 - 0.20 x10(3) uL 0.05    Immature Granulocyte Absolute      0.00 - 1.00 x10(3) uL 0.01    Neutrophils %      % 37.4    Lymphocytes %      % 48.5    Monocytes %      % 10.0    Eosinophils %      % 2.9    Basophils %      % 1.0    Immature Granulocyte %      % 0.2    CREATININE      0.55 - 1.02 mg/dL 0.89    EGFR      >=60 mL/min/1.73m2 77    Albumin      3.2 - 4.8 g/dL 4.4    ALT (SGPT)      10 - 49 U/L 20    AST (SGOT)      <=34 U/L 21    C-REACTIVE PROTEIN      <1.00 mg/dL <0.40    SED RATE      0 - 30 mm/Hr 13        ASSESSMENT/PLAN:      Seropositive RA- stable  - Found to have + RF and CCP.  No erosions on x-rays  - Currently on Enbrel weekly.  Recently added methotrexate due to recurrent pain and swelling in her hands.  Symptoms have improved  - She will continue Enbrel weekly methotrexate 4 pills weekly and folic acid daily  - Blood work in November was normal  - She will repeat blood work next month and continue to monitor every 3  to 4 months     Lichen sclerosis  - Now on methotrexate and this is improved     Pt will f/u in 3-4 mos      Smita Heck MD  1/30/2024  8:40 AM

## 2024-07-18 ENCOUNTER — PATIENT MESSAGE (OUTPATIENT)
Dept: RHEUMATOLOGY | Facility: CLINIC | Age: 54
End: 2024-07-18

## 2024-07-18 DIAGNOSIS — M05.79 RHEUMATOID ARTHRITIS INVOLVING MULTIPLE SITES WITH POSITIVE RHEUMATOID FACTOR (HCC): ICD-10-CM

## 2024-07-18 NOTE — TELEPHONE ENCOUNTER
From: Rebekah Ramos  To: Smita Umang  Sent: 7/18/2024 7:21 AM CDT  Subject: Consistent pain    Dr. Nevarez,    I am having consistent pain in my feet. It lasts most of the day and is starting to affect my gait. The pain is about a 3. Not bad, but there.     I am currently on enbre and 4 methotrexate. Should I come in for an appointment?    Rebekah

## 2024-07-19 NOTE — TELEPHONE ENCOUNTER
I can give her a medrol dose pack in the mean time if she would like    Also she can be added on sept 20 1 pm for a sooner appt if she agrees

## 2024-07-22 RX ORDER — METHYLPREDNISOLONE 4 MG/1
TABLET ORAL
Qty: 1 EACH | Refills: 0 | Status: SHIPPED | OUTPATIENT
Start: 2024-07-22

## 2024-08-15 RX ORDER — CITALOPRAM 20 MG/1
20 TABLET ORAL DAILY
Qty: 90 TABLET | Refills: 3 | Status: SHIPPED | OUTPATIENT
Start: 2024-08-15

## 2024-08-15 NOTE — TELEPHONE ENCOUNTER
Please review. Protocol Failed; No Protocol    Future Appointments   Date Time Provider Department Center   11/20/2024 11:00 AM [facility]   11/25/2024  2:40 PM Smita Heck MD [facility]        Requested Prescriptions   Pending Prescriptions Disp Refills    CITALOPRAM 20 MG Oral Tab [Pharmacy Med Name: CITALOPRAM HYDROBROMIDE TABS 20MG] 90 tablet 3     Sig: TAKE 1 TABLET DAILY       Psychiatric Non-Scheduled (Anti-Anxiety) Failed - 8/12/2024  1:11 AM        Failed - In person appointment or virtual visit in the past 6 mos or appointment in next 3 mos     Recent Outpatient Visits              6 months ago Encounter for therapeutic drug monitoring    [facility] Smita Heck MD    Office Visit    6 months ago Vulvar lesion    [facility] - OB/GYN Anna Molina MD    Office Visit    8 months ago Lichen sclerosus of vulva    [facility] - [facility] Anna Molina MD    Office Visit    9 months ago Rheumatoid arthritis involving multiple sites with positive rheumatoid factor (HCC)    [facility] Smita Heck MD    Office Visit    11 months ago Colon cancer screening    [facility]    Nurse Only          Future Appointments         Provider Department Appt Notes    In 3 months Dana Grullon MD [facility] Annual Physical    In 3 months Smita Heck MD [facility] Medication change                    Passed - Depression Screening completed within the past 12 months               Future Appointments         Provider Department Appt Notes    In 3 months Dana Grullon MD [facility] Annual Physical    In 3 months [facility] Heck [facility]          Recent Outpatient Visits              [facility]    [facility]    6 months ago [facility]    Office Visit    8 months ago Lichen sclerosus of vulva    [facility]    Office Visit    9 months ago Rheumatoid arthritis involving multiple sites with positive rheumatoid factor (HCC)    [facility]    [facility]    11 months ago [facility] cancer screening    [facility]    [facility]

## 2024-08-16 ENCOUNTER — TELEPHONE (OUTPATIENT)
Dept: OBGYN CLINIC | Facility: CLINIC | Age: 54
End: 2024-08-16

## 2024-09-16 DIAGNOSIS — M05.79 RHEUMATOID ARTHRITIS INVOLVING MULTIPLE SITES WITH POSITIVE RHEUMATOID FACTOR (HCC): ICD-10-CM

## 2024-09-16 RX ORDER — MEDROXYPROGESTERONE ACETATE 150 MG/ML
50 INJECTION, SUSPENSION INTRAMUSCULAR WEEKLY
Qty: 4 EACH | Refills: 5 | Status: SHIPPED | OUTPATIENT
Start: 2024-09-16

## 2024-09-16 NOTE — TELEPHONE ENCOUNTER
LOV: 1/30/24  Future Appointments   Date Time Provider Department Center   10/7/2024 10:30 AM Anna Molina MD [facility]  [facility]   11/20/2024 11:40 AM Dana Grullon MD [facility]   11/25/2024  2:40 PM [facility]       ASSESSMENT/PLAN:      Seropositive RA- stable  - Found to have + RF and CCP.  No erosions on x-rays  - Currently on Enbrel weekly.  Recently added methotrexate due to recurrent pain and swelling in her hands.  Symptoms have improved  - She will continue Enbrel weekly methotrexate 4 pills weekly and folic acid daily  - Blood work in November was normal  - She will repeat blood work next month and continue to monitor every 3 to 4 months     Lichen sclerosis  - Now on methotrexate and this is improved     Pt will f/u in 3-4 mos      Smita Heck MD  1/30/2024  8:40 AM

## 2024-09-29 DIAGNOSIS — Z51.81 ENCOUNTER FOR THERAPEUTIC DRUG MONITORING: ICD-10-CM

## 2024-09-29 DIAGNOSIS — M05.79 RHEUMATOID ARTHRITIS INVOLVING MULTIPLE SITES WITH POSITIVE RHEUMATOID FACTOR (HCC): ICD-10-CM

## 2024-09-30 RX ORDER — METHOTREXATE 2.5 MG/1
10 TABLET ORAL WEEKLY
Qty: 52 TABLET | Refills: 0 | Status: SHIPPED | OUTPATIENT
Start: 2024-09-30

## 2024-09-30 NOTE — TELEPHONE ENCOUNTER
LOV: 1/30/24  Future Appointments   Date Time Provider Department Center   10/7/2024 10:30 AM Anna Molina MD [facility]  [facility]   11/20/2024 11:40 AM [facility]   11/25/2024  2:40 PM Smita Heck MD [facility]     Labs: AST 21 ALT 20  7/13/24  ASSESSMENT/PLAN:      Seropositive RA- stable  - Found to have + RF and CCP.  No erosions on x-rays  - Currently on Enbrel weekly.  Recently added methotrexate due to recurrent pain and swelling in her hands.  Symptoms have improved  - She will continue Enbrel weekly methotrexate 4 pills weekly and folic acid daily  - Blood work in November was normal  - She will repeat blood work next month and continue to monitor every 3 to 4 months     Lichen sclerosis  - Now on methotrexate and this is improved     Pt will f/u in 3-4 mos      Smita Heck MD  1/30/2024  8:40 AM

## 2024-10-07 ENCOUNTER — OFFICE VISIT (OUTPATIENT)
Dept: OBGYN CLINIC | Facility: CLINIC | Age: 54
End: 2024-10-07
Payer: COMMERCIAL

## 2024-10-07 VITALS
DIASTOLIC BLOOD PRESSURE: 74 MMHG | BODY MASS INDEX: 25.5 KG/M2 | SYSTOLIC BLOOD PRESSURE: 110 MMHG | HEIGHT: 64 IN | WEIGHT: 149.38 LBS

## 2024-10-07 DIAGNOSIS — Z12.4 SCREENING FOR CERVICAL CANCER: ICD-10-CM

## 2024-10-07 DIAGNOSIS — Z01.419 ENCOUNTER FOR ANNUAL ROUTINE GYNECOLOGICAL EXAMINATION: Primary | ICD-10-CM

## 2024-10-07 DIAGNOSIS — N94.10 DYSPAREUNIA IN FEMALE: ICD-10-CM

## 2024-10-07 PROCEDURE — 88175 CYTOPATH C/V AUTO FLUID REDO: CPT | Performed by: OBSTETRICS & GYNECOLOGY

## 2024-10-07 PROCEDURE — 87624 HPV HI-RISK TYP POOLED RSLT: CPT | Performed by: OBSTETRICS & GYNECOLOGY

## 2024-10-07 RX ORDER — ESTRADIOL 10 UG/1
10 INSERT VAGINAL
Qty: 24 TABLET | Refills: 4 | Status: SHIPPED | OUTPATIENT
Start: 2024-10-07

## 2024-10-07 RX ORDER — CLOBETASOL PROPIONATE 0.5 MG/G
1 CREAM TOPICAL 2 TIMES DAILY
Qty: 60 G | Refills: 1 | Status: SHIPPED | OUTPATIENT
Start: 2024-10-07

## 2024-10-07 RX ORDER — ESTRADIOL 10 UG/1
10 INSERT VAGINAL
Qty: 14 TABLET | Refills: 0 | Status: SHIPPED | OUTPATIENT
Start: 2024-10-07 | End: 2024-10-21

## 2024-10-07 NOTE — PROGRESS NOTES
ANNUAL GYN EXAM  [facility]    CHIEF COMPLAINT:    Chief Complaint   Patient presents with    Annual     C/o painful intercourse       HISTORY OF PRESENT ILLNESS:   Rebekah Ramos is a 54 year old female   who presents for annual well woman visit.  She is feeling well without complaints.    History lichen sclerosus controlled with clobetasol/cortisone.     History rheumatoid arthritis and is on methotrexate.    Had excision condyloma . No new lesions.    Patient's last menstrual period was 2023 (approximate).  Menopausal.    Has had some return of dyspareunia - pelvic floor physical therapy has worked in past.    PAST GYNECOLOGICAL HISTORY & OTHER PREVENTIVE MEDICINE  LMP: Patient's last menstrual period was 2023 (approximate).  Menarche: 14 years old (10/7/2024 10:27 AM)  Period Cycle (Days): 2023 lmp (10/7/2024 10:27 AM)  Hx Prior Abnormal Pap: Yes (hpv over 15 years ago) (10/7/2024 10:27 AM)  Pap Date: 21 (10/7/2024 10:27 AM)  Pap Result Notes: neg (10/7/2024 10:27 AM)  Follow Up Recommendation: last mammo done 2023 wnl (10/7/2024 10:27 AM)    Complications: denies PMPB  Gravita/Parity: [id number]: current -vasectomy; Previous - oral contraceptive pill, Depo Provera  Sexually transmitted disease history:HPV  Number of sexual partners: current sexual partners: 1, yrs, Lifetime partners:   Pap history: NILM, neg HRHPV Last pap/result: 2021; history abnormals: colposcopy  Date of last mammogram: UTD, normal; history abnormals denies  Last Colonoscopy: scheduled ; history abnormals   Abuse history: denies  Vaginal discharge: denies  Bladder symptoms: denies    PAST MEDICAL HISTORY:   Past Medical History:    Depression    Disorder of thyroid    Hypothyroidism    Rheumatoid arthritis (HCC)        PAST SURGICAL HISTORY:   Past Surgical History:   Procedure Laterality Date    Colonoscopy N/A 2023    Procedure: COLONOSCOPY;  Surgeon: Bella Tijerina MD;   Location: [facility] MAIN OR    Tonsillectomy          PAST OB HISTORY:  OB History    Para Term  AB Living   0 0           SAB IAB Ectopic Multiple Live Births                   CURRENT MEDICATIONS:      Current Outpatient Medications:     METHOTREXATE 2.5 MG Oral Tab, TAKE 4 TABLETS (10 MG TOTAL) BY MOUTH ONCE A WEEK, Disp: 52 tablet, Rfl: 0    citalopram 20 MG Oral Tab, Take 1 tablet (20 mg total) by mouth daily., Disp: 90 tablet, Rfl: 3    methylPREDNISolone 4 MG Oral Tablet Therapy Pack, Take as directed on package., Disp: 1 each, Rfl: 0    FOLIC ACID 1 MG Oral Tab, TAKE 1 TABLET BY MOUTH EVERY DAY, Disp: 90 tablet, Rfl: 0    triamcinolone 0.1 % External Ointment, Apply 1 Application topically nightly., Disp: 15 g, Rfl: 2    triamcinolone 0.1 % External Ointment, Apply 1 Application topically 2 (two) times daily as needed., Disp: 15 g, Rfl: 1    levothyroxine 88 MCG Oral Tab, Take 1 tablet (88 mcg total) by mouth before breakfast., Disp: 90 tablet, Rfl: 3    cycloSPORINE 0.05 % Ophthalmic Emulsion, Place 1 drop into both eyes every 12 (twelve) hours., Disp: , Rfl:     clobetasol 0.05 % External Cream, Apply 1 Application topically in the morning and 1 Application before bedtime., Disp: , Rfl:     ENBREL SURECLICK 50 MG/ML Subcutaneous Solution Auto-injector, INJECT 50 MG (1 ML) UNDER THE SKIN ONCE A WEEK, Disp: 4 each, Rfl: 5    ALLERGIES:  No Known Allergies    SOCIAL HISTORY:  Social History     Socioeconomic History    Marital status:    Tobacco Use    Smoking status: Never    Smokeless tobacco: Never   Vaping Use    Vaping status: Never Used   Substance and Sexual Activity    Alcohol use: Yes     Comment: Rarely    Drug use: Not Currently     Types: Cannabis    Sexual activity: Yes   Other Topics Concern    Blood Transfusions No       FAMILY HISTORY:  No family history on file.  ASSESSMENTS:  REVIEW OF SYSTEMS:  CONSTITUTIONAL:  negative for fevers, chills and sweats    EYES:  negative for   blurred vision and visual disturbance  RESPIRATORY:  negative for  cough and shortness of breath  CARDIOVASCULAR:  negative for  chest pain, palpitations  GASTROINTESTINAL:  No constipation/diarrhea, no pain  GENITOURINARY:  See History of Present Illness  INTEGUMENT/BREAST: Breast: no masses, no nipple discharge  ENDOCRINE:  negative for acne, constipation, diarrhea, cold intolerance, heat intolerance, fatigue, hair loss, weight gain and weight loss  MUSCULOSKELETAL:  negative for joint pain  NEUROLOGICAL:  negative for dizziness/lightheadedness and headaches  BEHAVIOR/PSYCH:  Negative for depressed mood, anhedonia and anxiety    PHYSICAL EXAM  Patient's last menstrual period was 06/01/2023 (approximate).   Vitals:    10/07/24 1029   BP: 110/74   Weight: 149 lb 6.4 oz (67.8 kg)   Height: 64\"       CONSTITUTIONAL: Awake, alert, cooperative, no apparent distress, and appears stated age   NECK:  symmetrical, trachea midline, no adenopathy, thyroid symmetric, not enlarged   LUNGS: respiration unlabored  CARDIOVASCULAR: no peripheral edema or varicosities, skin warm and dry  ABDOMEN: Soft, non-distended, non-tender, no masses palpated    CHEST/BREASTS: Breasts symmetrical, skin without lesion(s), no nipple retraction or dimpling, no nipple discharge, no masses palpated, no axillary or supraclavicular adenopathy  GENITAL/URINARY:    External Genitalia:  General appearance; normal, Hair distribution; normal, Lesions absent   Urethral Meatus:  Lesions absent, Prolapse absent  Bladder:  Tenderness absent, Cystocele absent  Vagina:  Discharge absent, Lesions absent, Pelvic support normal  Cervix:  Lesions absent, Discharge absent, Tenderness absent  Uterus:  Size normal, Masses absent, Tenderness absent  Adnexa:  Masses absent, Tenderness absent  Anus/Perineum:  Lesions absent    MUSCULOSKELETAL: There is no redness, warmth, or swelling of the joints.  Full range of motion noted.  Motor strength is 5 out of 5 all extremities  bilaterally.  Tone is normal.  NEUROLOGIC: Patient is awake, alert and oriented to name, place and time.  Casual gait is normal.  SKIN: no bruising or bleeding and no rashes  PSYCHIATRIC: Behavior:  Appropriate  Mood:  appropriate  ASSESSMENT AND PLAN:  1. Encounter for annual routine gynecological examination    - [facility] 2D+3D SCREENING BILAT (CPT=77067/13855); Future    2. Screening for cervical cancer    - ThinPrep PAP Smear; Future  - Hpv Dna  High Risk , Thin Prep Collect; Future  - ThinPrep PAP Smear  - Hpv Dna  High Risk , Thin Prep Collect    3. Dyspareunia in female    - Pelvic Floor Therapy - [place] Location  - Estradiol (VAGIFEM) 10 MCG Vaginal Tab; Place 10 mcg vaginally twice a week for 14 days.  Dispense: 14 tablet; Refill: 0  - Estradiol (VAGIFEM) 10 MCG Vaginal Tab; Place 10 mcg vaginally twice a week.  Dispense: 24 tablet; Refill: 4      Preventive Medicine in a 54 year old female  Health Maintenance Topics with due status: Overdue       Topic Date Due    DTaP,Tdap,and Td Vaccines Never done    Zoster Vaccines Never done    Pap Smear 02/09/2024    Annual Physical 07/25/2024    Mammogram 09/16/2024       COUNSELING/EDUCATION PERFORMED:   Cervical Cancer Screening  Breast Cancer Screening - monthly self breast exam  Colon Cancer screening  follow up 1 yr or as needed  Anna Molina MD

## 2024-10-08 LAB — HPV E6+E7 MRNA CVX QL NAA+PROBE: NEGATIVE

## 2024-10-10 ENCOUNTER — TELEPHONE (OUTPATIENT)
Dept: OBGYN CLINIC | Facility: CLINIC | Age: 54
End: 2024-10-10

## 2024-10-10 DIAGNOSIS — N94.10 DYSPAREUNIA IN FEMALE: ICD-10-CM

## 2024-10-10 RX ORDER — ESTRADIOL 10 UG/1
10 INSERT VAGINAL DAILY
Qty: 14 TABLET | Refills: 0 | Status: SHIPPED | OUTPATIENT
Start: 2024-10-10 | End: 2024-10-24

## 2024-10-10 NOTE — TELEPHONE ENCOUNTER
Received fax from [facility] for clarification on instruction, informed DR. Molina and pt to take daily x 14 days than twice weekly.    Estradiol (VAGIFEM) 10 MCG Vaginal Tab 14 tablet 0 10/7/2024 10/21/2024   Sig:   Place 10 mcg vaginally twice a week for 14 days.     Route:   Vaginal       Correct order placed.

## 2024-10-14 ENCOUNTER — TELEPHONE (OUTPATIENT)
Dept: OBGYN CLINIC | Facility: CLINIC | Age: 54
End: 2024-10-14

## 2024-10-14 DIAGNOSIS — N94.10 DYSPAREUNIA IN FEMALE: Primary | ICD-10-CM

## 2024-10-14 RX ORDER — ESTRADIOL 10 UG/1
10 INSERT VAGINAL DAILY
Qty: 16 TABLET | Refills: 0 | Status: SHIPPED | OUTPATIENT
Start: 2024-10-14 | End: 2024-11-13

## 2024-10-14 NOTE — TELEPHONE ENCOUNTER
Received fax from [facility] requesting rx be changed to 16 tabs as the pharmacy cannot split a box. New rx sent.

## 2024-10-21 ENCOUNTER — LAB ENCOUNTER (OUTPATIENT)
Dept: LAB | Facility: HOSPITAL | Age: 54
End: 2024-10-21
Attending: INTERNAL MEDICINE
Payer: COMMERCIAL

## 2024-10-21 DIAGNOSIS — Z51.81 ENCOUNTER FOR THERAPEUTIC DRUG MONITORING: ICD-10-CM

## 2024-10-21 DIAGNOSIS — M05.79 RHEUMATOID ARTHRITIS INVOLVING MULTIPLE SITES WITH POSITIVE RHEUMATOID FACTOR (HCC): ICD-10-CM

## 2024-10-21 LAB
ALBUMIN SERPL-MCNC: 4.9 G/DL (ref 3.2–4.8)
ALT SERPL-CCNC: 35 U/L
AST SERPL-CCNC: 29 U/L (ref ?–34)
BASOPHILS # BLD AUTO: 0.06 X10(3) UL (ref 0–0.2)
BASOPHILS NFR BLD AUTO: 1.1 %
CREAT BLD-MCNC: 0.94 MG/DL
CRP SERPL-MCNC: <0.4 MG/DL (ref ?–1)
DEPRECATED RDW RBC AUTO: 42.9 FL (ref 35.1–46.3)
EGFRCR SERPLBLD CKD-EPI 2021: 73 ML/MIN/1.73M2 (ref 60–?)
EOSINOPHIL # BLD AUTO: 0.17 X10(3) UL (ref 0–0.7)
EOSINOPHIL NFR BLD AUTO: 3 %
ERYTHROCYTE [DISTWIDTH] IN BLOOD BY AUTOMATED COUNT: 12.6 % (ref 11–15)
ERYTHROCYTE [SEDIMENTATION RATE] IN BLOOD: 9 MM/HR
HCT VFR BLD AUTO: 39.6 %
HGB BLD-MCNC: 13.6 G/DL
IMM GRANULOCYTES # BLD AUTO: 0.01 X10(3) UL (ref 0–1)
IMM GRANULOCYTES NFR BLD: 0.2 %
LYMPHOCYTES # BLD AUTO: 2.42 X10(3) UL (ref 1–4)
LYMPHOCYTES NFR BLD AUTO: 43.4 %
MCH RBC QN AUTO: 32.2 PG (ref 26–34)
MCHC RBC AUTO-ENTMCNC: 34.3 G/DL (ref 31–37)
MCV RBC AUTO: 93.6 FL
MONOCYTES # BLD AUTO: 0.42 X10(3) UL (ref 0.1–1)
MONOCYTES NFR BLD AUTO: 7.5 %
NEUTROPHILS # BLD AUTO: 2.5 X10 (3) UL (ref 1.5–7.7)
NEUTROPHILS # BLD AUTO: 2.5 X10(3) UL (ref 1.5–7.7)
NEUTROPHILS NFR BLD AUTO: 44.8 %
PLATELET # BLD AUTO: 320 10(3)UL (ref 150–450)
RBC # BLD AUTO: 4.23 X10(6)UL
WBC # BLD AUTO: 5.6 X10(3) UL (ref 4–11)

## 2024-10-21 PROCEDURE — 84460 ALANINE AMINO (ALT) (SGPT): CPT

## 2024-10-21 PROCEDURE — 84450 TRANSFERASE (AST) (SGOT): CPT

## 2024-10-21 PROCEDURE — 82565 ASSAY OF CREATININE: CPT

## 2024-10-21 PROCEDURE — 86140 C-REACTIVE PROTEIN: CPT

## 2024-10-21 PROCEDURE — 85025 COMPLETE CBC W/AUTO DIFF WBC: CPT

## 2024-10-21 PROCEDURE — 85652 RBC SED RATE AUTOMATED: CPT

## 2024-10-21 PROCEDURE — 36415 COLL VENOUS BLD VENIPUNCTURE: CPT

## 2024-10-21 PROCEDURE — 82040 ASSAY OF SERUM ALBUMIN: CPT

## 2024-11-02 DIAGNOSIS — M05.79 RHEUMATOID ARTHRITIS INVOLVING MULTIPLE SITES WITH POSITIVE RHEUMATOID FACTOR (HCC): ICD-10-CM

## 2024-11-02 DIAGNOSIS — Z51.81 ENCOUNTER FOR THERAPEUTIC DRUG MONITORING: ICD-10-CM

## 2024-11-03 RX ORDER — FOLIC ACID 1 MG/1
1 TABLET ORAL DAILY
Qty: 90 TABLET | Refills: 0 | Status: SHIPPED | OUTPATIENT
Start: 2024-11-03

## 2024-11-04 RX ORDER — LEVOTHYROXINE SODIUM 88 UG/1
88 TABLET ORAL
Qty: 90 TABLET | Refills: 3 | Status: SHIPPED | OUTPATIENT
Start: 2024-11-04

## 2024-11-20 ENCOUNTER — OFFICE VISIT (OUTPATIENT)
Dept: INTERNAL MEDICINE CLINIC | Facility: CLINIC | Age: 54
End: 2024-11-20
Payer: COMMERCIAL

## 2024-11-20 ENCOUNTER — LAB ENCOUNTER (OUTPATIENT)
Dept: LAB | Age: 54
End: 2024-11-20
Attending: INTERNAL MEDICINE
Payer: COMMERCIAL

## 2024-11-20 VITALS
HEIGHT: 64 IN | WEIGHT: 150 LBS | HEART RATE: 60 BPM | BODY MASS INDEX: 25.61 KG/M2 | DIASTOLIC BLOOD PRESSURE: 88 MMHG | SYSTOLIC BLOOD PRESSURE: 130 MMHG

## 2024-11-20 DIAGNOSIS — E55.9 VITAMIN D DEFICIENCY: ICD-10-CM

## 2024-11-20 DIAGNOSIS — N94.10 DYSPAREUNIA IN FEMALE: ICD-10-CM

## 2024-11-20 DIAGNOSIS — N39.46 MIXED STRESS AND URGE URINARY INCONTINENCE: ICD-10-CM

## 2024-11-20 DIAGNOSIS — Z00.00 PHYSICAL EXAM, ANNUAL: Primary | ICD-10-CM

## 2024-11-20 DIAGNOSIS — R06.83 SNORES: ICD-10-CM

## 2024-11-20 DIAGNOSIS — J45.990 EXERCISE-INDUCED ASTHMA (HCC): ICD-10-CM

## 2024-11-20 DIAGNOSIS — F98.8 ATTENTION DEFICIT DISORDER, UNSPECIFIED TYPE: ICD-10-CM

## 2024-11-20 DIAGNOSIS — R06.81 APNEA SPELL: ICD-10-CM

## 2024-11-20 LAB
ALBUMIN SERPL-MCNC: 4.7 G/DL (ref 3.2–4.8)
ALBUMIN/GLOB SERPL: 1.6 {RATIO} (ref 1–2)
ALP LIVER SERPL-CCNC: 79 U/L
ALT SERPL-CCNC: 26 U/L
ANION GAP SERPL CALC-SCNC: 8 MMOL/L (ref 0–18)
AST SERPL-CCNC: 24 U/L (ref ?–34)
BILIRUB SERPL-MCNC: 0.5 MG/DL (ref 0.3–1.2)
BUN BLD-MCNC: 10 MG/DL (ref 9–23)
BUN/CREAT SERPL: 11 (ref 10–20)
CALCIUM BLD-MCNC: 10.1 MG/DL (ref 8.7–10.4)
CHLORIDE SERPL-SCNC: 107 MMOL/L (ref 98–112)
CHOLEST SERPL-MCNC: 202 MG/DL (ref ?–200)
CO2 SERPL-SCNC: 26 MMOL/L (ref 21–32)
CREAT BLD-MCNC: 0.91 MG/DL
EGFRCR SERPLBLD CKD-EPI 2021: 75 ML/MIN/1.73M2 (ref 60–?)
FASTING PATIENT LIPID ANSWER: YES
FASTING STATUS PATIENT QL REPORTED: YES
GLOBULIN PLAS-MCNC: 3 G/DL (ref 2–3.5)
GLUCOSE BLD-MCNC: 87 MG/DL (ref 70–99)
HDLC SERPL-MCNC: 73 MG/DL (ref 40–59)
LDLC SERPL CALC-MCNC: 117 MG/DL (ref ?–100)
NONHDLC SERPL-MCNC: 129 MG/DL (ref ?–130)
OSMOLALITY SERPL CALC.SUM OF ELEC: 290 MOSM/KG (ref 275–295)
POTASSIUM SERPL-SCNC: 4.1 MMOL/L (ref 3.5–5.1)
PROT SERPL-MCNC: 7.7 G/DL (ref 5.7–8.2)
SODIUM SERPL-SCNC: 141 MMOL/L (ref 136–145)
T3FREE SERPL-MCNC: 3.1 PG/ML (ref 2.4–4.2)
T4 FREE SERPL-MCNC: 1.5 NG/DL (ref 0.8–1.7)
TRIGL SERPL-MCNC: 64 MG/DL (ref 30–149)
TSI SER-ACNC: 0.08 UIU/ML (ref 0.55–4.78)
VIT D+METAB SERPL-MCNC: 26.6 NG/ML (ref 30–100)
VLDLC SERPL CALC-MCNC: 11 MG/DL (ref 0–30)

## 2024-11-20 PROCEDURE — 36415 COLL VENOUS BLD VENIPUNCTURE: CPT

## 2024-11-20 PROCEDURE — 84481 FREE ASSAY (FT-3): CPT | Performed by: INTERNAL MEDICINE

## 2024-11-20 PROCEDURE — 82306 VITAMIN D 25 HYDROXY: CPT

## 2024-11-20 PROCEDURE — 80061 LIPID PANEL: CPT | Performed by: INTERNAL MEDICINE

## 2024-11-20 PROCEDURE — 80053 COMPREHEN METABOLIC PANEL: CPT | Performed by: INTERNAL MEDICINE

## 2024-11-20 PROCEDURE — 84439 ASSAY OF FREE THYROXINE: CPT | Performed by: INTERNAL MEDICINE

## 2024-11-20 PROCEDURE — 84443 ASSAY THYROID STIM HORMONE: CPT | Performed by: INTERNAL MEDICINE

## 2024-11-20 NOTE — PROGRESS NOTES
Rebekah Ramos is a 54 year old female.  Chief Complaint   Patient presents with    Physical       HPI:   Patient comes for annual physical  C/C and physical  C/o didnt do the sleep study - states she stops breathing when she sleeps   Would benefit from going for pelvic floor therapy as she has painful intercourse and urinary incontinence  Had been diag with ADD or ADHD but never took meds and now she feels like its going on           HISTORY  New pt   C/c establish care  C/o annual physical  Moved here from [place] in feb 2023   Has a new mole on her genitals -- concerned bc she is at increased risk due to her LS    states that she did stop breathing once  She does not wake up once short of breath from her sleep , no choking in her sleep no excessive daytime sleepiness   + snores            PMH   RA -sees dr stephenson   Depression and for perimenapause - on citalopram  Lichen sclerosis - gyn in [place]    Hypothyroidism   Exercise induced asthma       Current Outpatient Medications   Medication Sig Dispense Refill    LEVOTHYROXINE 88 MCG Oral Tab TAKE 1 TABLET BEFORE BREAKFAST 90 tablet 3    FOLIC ACID 1 MG Oral Tab TAKE 1 TABLET BY MOUTH EVERY DAY 90 tablet 0    clobetasol 0.05 % External Cream Apply 1 Application topically in the morning and 1 Application before bedtime. 60 g 1    Estradiol (VAGIFEM) 10 MCG Vaginal Tab Place 10 mcg vaginally twice a week. 24 tablet 4    METHOTREXATE 2.5 MG Oral Tab TAKE 4 TABLETS (10 MG TOTAL) BY MOUTH ONCE A WEEK 52 tablet 0    ENBREL SURECLICK 50 MG/ML Subcutaneous Solution Auto-injector INJECT 50 MG (1 ML) UNDER THE SKIN ONCE A WEEK 4 each 5    citalopram 20 MG Oral Tab Take 1 tablet (20 mg total) by mouth daily. 90 tablet 3    cycloSPORINE 0.05 % Ophthalmic Emulsion Place 1 drop into both eyes every 12 (twelve) hours.        Past Medical History:    Depression    Disorder of thyroid    Hypothyroidism    Rheumatoid arthritis (HCC)      Past Surgical History:   Procedure  Laterality Date    Colonoscopy N/A 11/29/2023    Procedure: COLONOSCOPY;  Surgeon: Bella Tijerina MD;  Location: [facility] MAIN OR    Tonsillectomy        Social History:  Social History     Socioeconomic History    Marital status:    Tobacco Use    Smoking status: Never    Smokeless tobacco: Never   Vaping Use    Vaping status: Never Used   Substance and Sexual Activity    Alcohol use: Yes     Comment: Rarely    Drug use: Not Currently     Types: Cannabis    Sexual activity: Yes   Other Topics Concern    Blood Transfusions No        REVIEW OF SYSTEMS:   GENERAL HEALTH: No fevers, chills, sweats, fatigue  VISION: No recent vision problems, blurry vision or double vision  HEENT: No decreased hearing ear pain nasal congestion or sore throat  SKIN: denies any unusual skin lesions or rashes  RESPIRATORY: denies shortness of breath, cough, + wheezing during exercise -worse since getting covid   CARDIOVASCULAR: denies chest pain on exertion, palpitations, swelling in feet  GI: denies abdominal pain , +  heartburn- better with tums , no nausea or vomiting  : No Pain on urination, change in the color of urine, discharge, urinating frequently  MUS: No back pain, joint pain, muscle pain  NEURO: denies headaches , anxiety, depression-stable on meds     EXAM:   /88   Pulse 60   Ht 5' 4\" (1.626 m)   Wt 150 lb (68 kg)   LMP 06/01/2023 (Approximate)   BMI 25.75 kg/m²   GENERAL: well developed, well nourished,in no apparent distress  SKIN: no rashes,no suspicious lesions  HEENT: atraumatic, normocephalic,ears and throat are clear, no frontal or maxillary sinus tenderness, pupils equal and reactive to light bilaterally, extraocular muscles intact  NECK: supple,no adenopathy, nontender   LUNGS: clear to auscultation, no wheeze  CARDIO: RRR without murmur  GI: good BS's,no masses or tenderness  EXTREMITIES: no cyanosis, or edema  BREAST: Bilateral breasts without any lumps, bilateral axilla without any  lymphadenopathy, no nipple retraction or  discharge      ASSESSMENT AND PLAN:   Diagnoses and all orders for this visit:    Physical exam, annual  -     Comp Metabolic Panel (14)  -     Lipid Panel  -     TSH W Reflex To Free T4  Advised patient to watch what she eats and exercise, seatbelt use no texting and driving, sunscreen use advised  Dyspareunia in female  -     PHYSICAL THERAPY - INTERNAL  And   Mixed stress and urge urinary incontinence  -     PHYSICAL THERAPY - INTERNAL  Refer to pelvic floor therapy    Exercise-induced asthma (HCC)  -     Complete PFT w/Bronchodilator/Albuterol 2.5; Future  Check pulmonary function tests    Vitamin D deficiency  -     Vitamin D; Future  Recheck    Attention deficit disorder, unspecified type  -     OP REFERRAL TO [facility]  Will refer   Snores  -     DIAGOSTIC SLEEP STUDY  -     General sleep study; Future  And   Apnea spell  -     DIAGOSTIC SLEEP STUDY  -     General sleep study; Future  Sleep study ordered       Preventative medicine  Mammogram-ordered/scheduled   Colonoscopy- dr dyson 12/2023  Pap- 10/2024 dr cao   Labs -1/2023   3/2024 4/2024 ordered      The patient indicates understanding of these issues and agrees to the plan.  No follow-ups on file.

## 2024-11-22 ENCOUNTER — TELEPHONE (OUTPATIENT)
Age: 54
End: 2024-11-22

## 2024-11-22 NOTE — TELEPHONE ENCOUNTER
Daniel Welch,    I'm glad that we were able to connect today. Here are some psychological testing resources that may be a good fit. Please verify insurance coverage with any providers that you may choose to call and schedule with directly. If you need further assistance, please give our office a call at [phone]. You may also contact the [facility] 24/7 helpline at [phone] for additional support.     *Accepts [facility] & [employer]*   [facility]  [address], [address], [place], [place], [address]  Phone: [phone]    Yessenia Pollard PsyD  [facility]   [address], [address], [place], [address]  Phone: [phone]    *Accepts [employer]*   Rachna Negro, PhD  [facility]  [address], [address], [place], [place], [address]  Phone: [phone]    Perfecto Tirado PsyD  [facility]  [address], [address], [place], [place], [address]  Phone: [phone]    Antonieta CH LCSW (she/her/hers)  Patient Care Navigator - Mental Health  [facility]/Mental Health Division    [email or web address]  Request an assessment or support »

## 2024-11-23 ENCOUNTER — PATIENT MESSAGE (OUTPATIENT)
Dept: RHEUMATOLOGY | Facility: CLINIC | Age: 54
End: 2024-11-23

## 2024-11-25 ENCOUNTER — OFFICE VISIT (OUTPATIENT)
Dept: RHEUMATOLOGY | Facility: CLINIC | Age: 54
End: 2024-11-25
Payer: COMMERCIAL

## 2024-11-25 VITALS — SYSTOLIC BLOOD PRESSURE: 118 MMHG | HEART RATE: 67 BPM | DIASTOLIC BLOOD PRESSURE: 82 MMHG

## 2024-11-25 DIAGNOSIS — Z51.81 ENCOUNTER FOR THERAPEUTIC DRUG MONITORING: ICD-10-CM

## 2024-11-25 DIAGNOSIS — M05.79 RHEUMATOID ARTHRITIS INVOLVING MULTIPLE SITES WITH POSITIVE RHEUMATOID FACTOR (HCC): ICD-10-CM

## 2024-11-25 PROCEDURE — 99214 OFFICE O/P EST MOD 30 MIN: CPT | Performed by: INTERNAL MEDICINE

## 2024-11-25 RX ORDER — FOLIC ACID 1 MG/1
1 TABLET ORAL DAILY
Qty: 90 TABLET | Refills: 1 | Status: SHIPPED | OUTPATIENT
Start: 2024-11-25

## 2024-11-25 RX ORDER — METHOTREXATE 2.5 MG/1
10 TABLET ORAL WEEKLY
Qty: 52 TABLET | Refills: 1 | Status: SHIPPED | OUTPATIENT
Start: 2024-11-25

## 2024-11-25 NOTE — PROGRESS NOTES
Rebekah Ramos is a 54 year old female.    HPI:     Chief Complaint   Patient presents with    Rheumatoid Arthritis     Ophthalmogy Dr. Jamal Sow at [facility]     I had the pleasure of seeing Rebekah Ramos on 11/25/2024 for follow up Seropositive RA     Current medications:  Enbrel weekly- started 2014  Methotrexate 4 pills weekly- started Nov 2023  Previous medications:  Methotrexate- 2084-5823  Blood work:  RF 72, CCP>250    Interval History:  This is a 54 yo F with hx of Hypothyroid, GERD, Lichen sclerosis, Depression and Seropositive RA presents to establish care for RA.  She was diagnosed in 2009 when she presented with symmetrical joint pain and swelling involving her hands, wrists, shoulders and feet.  She was initially placed on methotrexate titrated up to 10 pills weekly.  She then moved to [place] and was placed on Enbrel weekly in 2014.  She stopped methotrexate.  She has been doing well on Enbrel.  She lost her insurance and was off Enbrel for about 1.5-month from February to March.  She started to experience some more joint pain involving her hands.  Now back on Enbrel.  Having some stiffness in her left index finger.  Otherwise no other joint pain or swelling.  No history of psoriasis.  No lower back pain.     10/27/2023:  Presents for f/u of Seropositive RA  On Enbrel now for the past 3 mos, was off of it due to moving   Was not doing well up until Saturday in hands and feet, swelling in the fingers and stiffness   Still has some stiffness in the pinky's and the left index finger, mild swelling  Was on methotrexate previously and wants to try it again. Also Lichen sclerosis has worsened off methotrexate   Was seen by eye doctor for left eye pain and was seen by ophthalmology and was told there was inflammation from RA    1/30/2024:  Presents for follow-up of seropositive RA  On Enbrel weekly.  She is having some more joint pain during her last visit involving her hands, now on methotrexate 4  pills weekly.  Joint pain is improved.  At times she will have some stiffness and pain and swelling her left index finger but overall stable.  Her lichen sclerosis also has improved on methotrexate  She will be seeing the ophthalmologist soon, she was on prednisone eyedrops for inflammation in the eyes    11/25/2024:  Presents for follow-up of seropositive RA  She is on Enbrel weekly and  methotrexate 4 pills weekly, FA daily  She is doing well, less pain and stiff  No stiffness in the morning  Her lichen sclerosis also has improved on methotrexate  She see's ophthomology for Keratoconjunctivitis sicca and is on restasis          HISTORY:  Past Medical History:    Depression    Disorder of thyroid    Hypothyroidism    Rheumatoid arthritis (HCC)      Social Hx Reviewed   Family Hx Reviewed     Medications (Active prior to today's visit):  Current Outpatient Medications   Medication Sig Dispense Refill    LEVOTHYROXINE 88 MCG Oral Tab TAKE 1 TABLET BEFORE BREAKFAST 90 tablet 3    FOLIC ACID 1 MG Oral Tab TAKE 1 TABLET BY MOUTH EVERY DAY 90 tablet 0    clobetasol 0.05 % External Cream Apply 1 Application topically in the morning and 1 Application before bedtime. 60 g 1    Estradiol (VAGIFEM) 10 MCG Vaginal Tab Place 10 mcg vaginally twice a week. 24 tablet 4    METHOTREXATE 2.5 MG Oral Tab TAKE 4 TABLETS (10 MG TOTAL) BY MOUTH ONCE A WEEK 52 tablet 0    ENBREL SURECLICK 50 MG/ML Subcutaneous Solution Auto-injector INJECT 50 MG (1 ML) UNDER THE SKIN ONCE A WEEK 4 each 5    citalopram 20 MG Oral Tab Take 1 tablet (20 mg total) by mouth daily. 90 tablet 3    cycloSPORINE 0.05 % Ophthalmic Emulsion Place 1 drop into both eyes every 12 (twelve) hours.       .cmed  Allergies:  No Known Allergies      ROS:   All other ROS are negative.     PHYSICAL EXAM:   GEN: AAOx3, NAD  HEENT: EOMI, PERRLA, no injection or icterus, oral mucosa moist, no oral lesions. No lymphadenopathy. No facial rash  CVS: RRR, no murmurs rubs or gallops.  Equal 2+ distal pulses.   LUNGS: CTAB, no increased work of breathing  ABDOMEN:  soft NT/ND, +BS, no HSM  SKIN: No rashes or skin lesions. No nail findings  MSK:  Cervical spine: FROM  Hands: no synovitis in DIP, PIP and MCP, strong full fists  Wrist: FROM, no pain or swelling or warmth on palpation  Elbow: FROM, no pain or swelling or warmth on palpation  Shoulders: FROM, no pain or swelling or warmth on palpation  Hip: normal log roll, no lateral hip pain, ORAL test negative b/l  Knees: FROM, no warmth or effusion present. No pain with ROM.   Ankles: FROM, no pain or swelling or warmth on palpation  Feet: no pain with MTP squeeze, no toe swelling or pain or warmth on palpation with FROM  Spine: no lumbar or sacral pain on palpation.  NEURO: Cranial nerves II-XII intact grossly. 5/5 strength throughout in both upper and lower extremities, sensation intact.  PSYCH: normal mood       LABS:     Component      Latest Ref Rn 10/27/2023   WBC      4.0 - 11.0 x10(3) uL 8.0    RBC      3.80 - 5.30 x10(6)uL 4.38    Hemoglobin      12.0 - 16.0 g/dL 13.7    Hematocrit      35.0 - 48.0 % 40.4    MCV      80.0 - 100.0 fL 92.2    MCH      26.0 - 34.0 pg 31.3    MCHC      31.0 - 37.0 g/dL 33.9    RDW-SD      35.1 - 46.3 fL 41.7    RDW      11.0 - 15.0 % 12.2    Platelet Count      150.0 - 450.0 10(3)uL 387.0    Prelim Neutrophil Abs      1.50 - 7.70 x10 (3) uL 4.23    Neutrophils Absolute      1.50 - 7.70 x10(3) uL 4.23    Lymphocytes Absolute      1.00 - 4.00 x10(3) uL 2.89    Monocytes Absolute      0.10 - 1.00 x10(3) uL 0.57    Eosinophils Absolute      0.00 - 0.70 x10(3) uL 0.19    Basophils Absolute      0.00 - 0.20 x10(3) uL 0.06    Immature Granulocyte Absolute      0.00 - 1.00 x10(3) uL 0.02    Neutrophils %      % 53.0    Lymphocytes %      % 36.3    Monocytes %      % 7.2    Eosinophils %      % 2.4    Basophils %      % 0.8    Immature Granulocyte %      % 0.3    CREATININE      0.55 - 1.02 mg/dL 0.89    EGFR       >=60 mL/min/1.73m2 78    Albumin      3.4 - 5.0 g/dL 3.9    ALT (SGPT)      13 - 56 U/L 19    AST (SGOT)      15 - 37 U/L 18    C-REACTIVE PROTEIN      <0.30 mg/dL 0.93 (H)    SED RATE      0 - 30 mm/Hr 22      Imaging:     XR hands 2013:  There are no signs of acute trauma or is degenerative change. There is some very minimal juxtaarticular osteoporosis, but there is no evidence of joint soft-tissue swelling or marginal erosion.      ASSESSMENT/PLAN:     Seropositive RA- stable  - Found to have + RF and CCP.  No erosions on x-rays  - Currently on Enbrel weekly.  Recently added methotrexate due to recurrent pain and swelling in her hands.  Symptoms have improved  - She will continue Enbrel weekly methotrexate 4 pills weekly and folic acid daily  - Blood work in November was normal  - She had a flare in July with bilateral foot pain.  She went on a Medrol Dosepak and her pain improved  - She will repeat blood work next month and continue to monitor every 3 to 4 months    Lichen sclerosis  - Now on methotrexate and this is improved    Pt will f/u in 6 mos     There is a longitudinal care relationship with me, the care plan reflects the ongoing nature of the continuous relationship of care, and the medical record indicates that there is ongoing treatment of a serious/complex medical condition which I am currently managing.  is Applicable.     Smita Heck MD  11/25/2024  3:03 PM

## 2024-11-26 ENCOUNTER — HOSPITAL ENCOUNTER (OUTPATIENT)
Dept: MAMMOGRAPHY | Age: 54
Discharge: HOME | End: 2024-11-26
Attending: OBSTETRICS & GYNECOLOGY
Payer: COMMERCIAL

## 2024-11-26 DIAGNOSIS — Z01.419 ENCOUNTER FOR ANNUAL ROUTINE GYNECOLOGICAL EXAMINATION: ICD-10-CM

## 2024-11-26 PROCEDURE — 77067 SCR MAMMO BI INCL CAD: CPT | Performed by: OBSTETRICS & GYNECOLOGY

## 2024-11-26 PROCEDURE — 77063 BREAST TOMOSYNTHESIS BI: CPT | Performed by: OBSTETRICS & GYNECOLOGY

## 2024-12-09 DIAGNOSIS — E03.9 HYPOTHYROIDISM, UNSPECIFIED TYPE: Primary | ICD-10-CM

## 2024-12-31 DIAGNOSIS — Z51.81 ENCOUNTER FOR THERAPEUTIC DRUG MONITORING: ICD-10-CM

## 2024-12-31 DIAGNOSIS — M05.79 RHEUMATOID ARTHRITIS INVOLVING MULTIPLE SITES WITH POSITIVE RHEUMATOID FACTOR (HCC): ICD-10-CM

## 2024-12-31 RX ORDER — METHOTREXATE 2.5 MG/1
10 TABLET ORAL WEEKLY
Qty: 52 TABLET | Refills: 1 | OUTPATIENT
Start: 2024-12-31

## 2025-01-15 DIAGNOSIS — N94.10 DYSPAREUNIA IN FEMALE: ICD-10-CM

## 2025-01-17 RX ORDER — ESTRADIOL 10 UG/1
TABLET VAGINAL DAILY
Qty: 16 TABLET | Refills: 0 | Status: SHIPPED | OUTPATIENT
Start: 2025-01-17

## 2025-01-22 ENCOUNTER — PATIENT MESSAGE (OUTPATIENT)
Dept: PHYSICAL THERAPY | Age: 55
End: 2025-01-22

## 2025-01-27 NOTE — TELEPHONE ENCOUNTER
"  SUBJECTIVE:                                                    Maggi Reynolds is a 64 year old female who presents to clinic today for the following health issues:      RESPIRATORY SYMPTOMS      Duration: 3 weeks    Description  sore throat and cough    Severity: moderate    Accompanying signs and symptoms:  As mentioned abiove    History (predisposing factors):  none    Precipitating or alleviating factors: None    Therapies tried and outcome:  Z jyotsna 2 rounds   All.ANTHONY Bautista          Problem list and histories reviewed & adjusted, as indicated.  Additional history: 63 y/o new to me female c/o not feeling well for the last 3 weeks.  Admits to the above symptoms with cough and congestion being the worst.  She has been evaluated by PCP and treated with zpack x 2, and despite this, continues to cough.      BP Readings from Last 3 Encounters:   06/09/17 110/66   05/26/17 116/68   01/05/17 124/72    Wt Readings from Last 3 Encounters:   06/09/17 170 lb 8 oz (77.3 kg)   05/26/17 175 lb 3.2 oz (79.5 kg)   01/05/17 170 lb 3.2 oz (77.2 kg)                    Reviewed and updated as needed this visit by clinical staff       Reviewed and updated as needed this visit by Provider         ROS:  Constitutional, HEENT, cardiovascular, pulmonary, gi and gu systems are negative, except as otherwise noted.    OBJECTIVE:                                                    /66 (BP Location: Left arm, Patient Position: Left side, Cuff Size: Adult Large)  Pulse 103  Temp 97.7  F (36.5  C) (Tympanic)  Ht 5' 8.5\" (1.74 m)  Wt 170 lb 8 oz (77.3 kg)  LMP 08/15/2010  SpO2 97%  Breastfeeding? No  BMI 25.55 kg/m2  Body mass index is 25.55 kg/(m^2).  GENERAL: alert and no distress  EYES: Eyes grossly normal to inspection  HENT: ear canals and TM's normal, nose and mouth without ulcers or lesions  NECK: no adenopathy, no asymmetry, masses, or scars and thyroid normal to palpation  RESP: very faint expiratory wheeze, " Insurance    otherwise clear  CV: regular rate and rhythm, normal S1 S2, no S3 or S4, no murmur, click or rub, no peripheral edema and peripheral pulses strong  PSYCH: mentation appears normal, affect normal/bright    Diagnostic Test Results:  none      ASSESSMENT/PLAN:                                                            1. Cough    - benzonatate (TESSALON) 200 MG capsule; Take 1 capsule (200 mg) by mouth 3 times daily as needed for cough  Dispense: 21 capsule; Refill: 0    2. Bronchospasm    - predniSONE (DELTASONE) 20 MG tablet; Take 1 tablet (20 mg) by mouth daily  Dispense: 5 tablet; Refill: 0    Follow up if symptoms persist or worsen     Paco Moser PA-C  [facility]

## 2025-02-18 ENCOUNTER — TELEPHONE (OUTPATIENT)
Dept: SLEEP CENTER | Age: 55
End: 2025-02-18

## 2025-02-25 DIAGNOSIS — M05.79 RHEUMATOID ARTHRITIS INVOLVING MULTIPLE SITES WITH POSITIVE RHEUMATOID FACTOR (HCC): ICD-10-CM

## 2025-02-25 RX ORDER — MEDROXYPROGESTERONE ACETATE 150 MG/ML
50 INJECTION, SUSPENSION INTRAMUSCULAR WEEKLY
Qty: 12 ML | Refills: 0 | Status: SHIPPED | OUTPATIENT
Start: 2025-02-25

## 2025-02-25 NOTE — TELEPHONE ENCOUNTER
Requested Prescriptions     Pending Prescriptions Disp Refills    ENBREL SURECLICK 50 MG/ML Subcutaneous Solution Auto-injector [Pharmacy Med Name: ENBREL SURECLICK 50 MG/ML AI 4'S] 12 mL 0     Sig: INJECT 50 MG (1 ML) UNDER THE SKIN ONCE A WEEK       Future Appointments   Date Time Provider Department Center   4/2/2025  2:00 PM [facility]     LOV: 11/25/24   Last Refilled:9/16/24 #[id number]   Labs:    Component      Latest Ref Rng 10/21/2024   WBC      4.0 - 11.0 x10(3) uL 5.6    RBC      3.80 - 5.30 x10(6)uL 4.23    Hemoglobin      12.0 - 16.0 g/dL 13.6    Hematocrit      35.0 - 48.0 % 39.6    MCV      80.0 - 100.0 fL 93.6    MCH      26.0 - 34.0 pg 32.2    MCHC      31.0 - 37.0 g/dL 34.3    RDW-SD      35.1 - 46.3 fL 42.9    RDW      11.0 - 15.0 % 12.6    Platelet Count      150.0 - 450.0 10(3)uL 320.0    Prelim Neutrophil Abs      1.50 - 7.70 x10 (3) uL 2.50    Neutrophils Absolute      1.50 - 7.70 x10(3) uL 2.50    Lymphocytes Absolute      1.00 - 4.00 x10(3) uL 2.42    Monocytes Absolute      0.10 - 1.00 x10(3) uL 0.42    Eosinophils Absolute      0.00 - 0.70 x10(3) uL 0.17    Basophils Absolute      0.00 - 0.20 x10(3) uL 0.06    Immature Granulocyte Absolute      0.00 - 1.00 x10(3) uL 0.01    Neutrophils %      % 44.8    Lymphocytes %      % 43.4    Monocytes %      % 7.5    Eosinophils %      % 3.0    Basophils %      % 1.1    Immature Granulocyte %      % 0.2    CREATININE      0.55 - 1.02 mg/dL 0.94    EGFR      >=60 mL/min/1.73m2 73    SED RATE      0 - 30 mm/Hr 9    C-REACTIVE PROTEIN      <1.00 mg/dL <0.40    AST (SGOT)      <34 U/L 29    ALT (SGPT)      10 - 49 U/L 35    Albumin      3.2 - 4.8 g/dL 4.9 (H)       Legend:  (H) High      ASSESSMENT/PLAN:      Seropositive RA- stable  - Found to have + RF and CCP.  No erosions on x-rays  - Currently on Enbrel weekly.  Recently added methotrexate due to recurrent pain and swelling in her hands.  Symptoms have improved  - She will  continue Enbrel weekly methotrexate 4 pills weekly and folic acid daily  - Blood work in November was normal  - She had a flare in July with bilateral foot pain.  She went on a Medrol Dosepak and her pain improved  - She will repeat blood work next month and continue to monitor every 3 to 4 months     Lichen sclerosis  - Now on methotrexate and this is improved     Pt will f/u in 6 mos      There is a longitudinal care relationship with me, the care plan reflects the ongoing nature of the continuous relationship of care, and the medical record indicates that there is ongoing treatment of a serious/complex medical condition which I am currently managing.  is Applicable.      Smita Heck MD  11/25/2024  3:03 PM

## 2025-03-13 ENCOUNTER — PATIENT MESSAGE (OUTPATIENT)
Dept: RHEUMATOLOGY | Facility: CLINIC | Age: 55
End: 2025-03-13

## 2025-03-16 ENCOUNTER — LAB ENCOUNTER (OUTPATIENT)
Dept: LAB | Facility: REFERENCE LAB | Age: 55
End: 2025-03-16
Attending: INTERNAL MEDICINE
Payer: COMMERCIAL

## 2025-03-16 DIAGNOSIS — M05.79 RHEUMATOID ARTHRITIS INVOLVING MULTIPLE SITES WITH POSITIVE RHEUMATOID FACTOR (HCC): ICD-10-CM

## 2025-03-16 DIAGNOSIS — E03.9 HYPOTHYROIDISM, UNSPECIFIED TYPE: ICD-10-CM

## 2025-03-16 DIAGNOSIS — Z51.81 ENCOUNTER FOR THERAPEUTIC DRUG MONITORING: ICD-10-CM

## 2025-03-16 LAB
ALBUMIN SERPL-MCNC: 4.9 G/DL (ref 3.2–4.8)
ALT SERPL-CCNC: 16 U/L
AST SERPL-CCNC: 22 U/L (ref ?–34)
BASOPHILS # BLD AUTO: 0.07 X10(3) UL (ref 0–0.2)
BASOPHILS NFR BLD AUTO: 0.8 %
CREAT BLD-MCNC: 1.02 MG/DL
CRP SERPL-MCNC: <0.4 MG/DL (ref ?–1)
DEPRECATED RDW RBC AUTO: 42.8 FL (ref 35.1–46.3)
EGFRCR SERPLBLD CKD-EPI 2021: 66 ML/MIN/1.73M2 (ref 60–?)
EOSINOPHIL # BLD AUTO: 0.16 X10(3) UL (ref 0–0.7)
EOSINOPHIL NFR BLD AUTO: 1.9 %
ERYTHROCYTE [DISTWIDTH] IN BLOOD BY AUTOMATED COUNT: 12.4 % (ref 11–15)
ERYTHROCYTE [SEDIMENTATION RATE] IN BLOOD: 9 MM/HR
HCT VFR BLD AUTO: 41.3 %
HGB BLD-MCNC: 13.8 G/DL
IMM GRANULOCYTES # BLD AUTO: 0.01 X10(3) UL (ref 0–1)
IMM GRANULOCYTES NFR BLD: 0.1 %
LYMPHOCYTES # BLD AUTO: 3.48 X10(3) UL (ref 1–4)
LYMPHOCYTES NFR BLD AUTO: 40.8 %
MCH RBC QN AUTO: 31.3 PG (ref 26–34)
MCHC RBC AUTO-ENTMCNC: 33.4 G/DL (ref 31–37)
MCV RBC AUTO: 93.7 FL
MONOCYTES # BLD AUTO: 0.65 X10(3) UL (ref 0.1–1)
MONOCYTES NFR BLD AUTO: 7.6 %
NEUTROPHILS # BLD AUTO: 4.16 X10 (3) UL (ref 1.5–7.7)
NEUTROPHILS # BLD AUTO: 4.16 X10(3) UL (ref 1.5–7.7)
NEUTROPHILS NFR BLD AUTO: 48.8 %
PLATELET # BLD AUTO: 383 10(3)UL (ref 150–450)
RBC # BLD AUTO: 4.41 X10(6)UL
TSI SER-ACNC: 1.72 UIU/ML (ref 0.55–4.78)
WBC # BLD AUTO: 8.5 X10(3) UL (ref 4–11)

## 2025-03-16 PROCEDURE — 86140 C-REACTIVE PROTEIN: CPT

## 2025-03-16 PROCEDURE — 36415 COLL VENOUS BLD VENIPUNCTURE: CPT

## 2025-03-16 PROCEDURE — 85025 COMPLETE CBC W/AUTO DIFF WBC: CPT

## 2025-03-16 PROCEDURE — 84460 ALANINE AMINO (ALT) (SGPT): CPT

## 2025-03-16 PROCEDURE — 85652 RBC SED RATE AUTOMATED: CPT

## 2025-03-16 PROCEDURE — 82040 ASSAY OF SERUM ALBUMIN: CPT

## 2025-03-16 PROCEDURE — 82565 ASSAY OF CREATININE: CPT

## 2025-03-16 PROCEDURE — 84443 ASSAY THYROID STIM HORMONE: CPT

## 2025-03-16 PROCEDURE — 84450 TRANSFERASE (AST) (SGOT): CPT

## 2025-03-20 DIAGNOSIS — N94.10 DYSPAREUNIA IN FEMALE: ICD-10-CM

## 2025-03-20 RX ORDER — ESTRADIOL 10 UG/1
TABLET VAGINAL DAILY
Qty: 16 TABLET | Refills: 0 | Status: SHIPPED | OUTPATIENT
Start: 2025-03-20

## 2025-03-25 ENCOUNTER — PATIENT MESSAGE (OUTPATIENT)
Dept: OBGYN CLINIC | Facility: CLINIC | Age: 55
End: 2025-03-25

## 2025-03-25 ENCOUNTER — TELEPHONE (OUTPATIENT)
Age: 55
End: 2025-03-25

## 2025-03-25 NOTE — TELEPHONE ENCOUNTER
Current Outpatient Medications   Medication Sig Dispense Refill    ENBREL SURECLICK 50 MG/ML Subcutaneous Solution Auto-injector INJECT 50 MG (1 ML) UNDER THE SKIN ONCE A WEEK 12 mL 0       PA is set to     Key: [id number]

## 2025-04-01 ENCOUNTER — TELEPHONE (OUTPATIENT)
Dept: SLEEP CENTER | Age: 55
End: 2025-04-01

## 2025-04-02 ENCOUNTER — OFFICE VISIT (OUTPATIENT)
Dept: SLEEP CENTER | Age: 55
End: 2025-04-02
Attending: INTERNAL MEDICINE
Payer: COMMERCIAL

## 2025-04-02 DIAGNOSIS — R06.83 SNORES: ICD-10-CM

## 2025-04-02 DIAGNOSIS — R06.81 APNEA SPELL: ICD-10-CM

## 2025-04-02 PROCEDURE — 95806 SLEEP STUDY UNATT&RESP EFFT: CPT

## 2025-04-03 NOTE — TELEPHONE ENCOUNTER
PA start    Prior authorization for: Enbrel     Medication form: 50 mg pen     Submission method: [employer]    Tracking #:    QF-TB result: annual due    Component      Latest Ref Rng 11/28/2023   Quantiferon TB NIL      IU/mL 0.05    Quantiferon-TB1 Minus NIL      IU/mL 0.04    Quantiferon-TB2 Minus NIL      IU/mL 0.04    Quantiferon TB Mitogen minus NIL      IU/mL >10.00    Quantiferon TB Result      Negative  Negative

## 2025-04-03 NOTE — TELEPHONE ENCOUNTER
PA Approved    Prior authorization for: Enbrel     Medication form: 50 mg pen     Approval #: [id number]    Approved dates: 3/4/25 to 4/3/26    Pharmacy for medication: [facility]     QF-TB results: annual due

## 2025-04-04 NOTE — PROCEDURES
[facility]  Accredited by the American Academy of Sleep Medicine (AASM)    PATIENT'S NAME: ALENA SMITH   ATTENDING PHYSICIAN: Dana Grullon MD   REFERRING PHYSICIAN: Dana Grullon MD   PATIENT ACCOUNT #: [id number] LOCATION: [facility]   MEDICAL RECORD #: [id number] YOB: 1970   DATE OF STUDY: 04/02/2025       SLEEP STUDY REPORT    STUDY TYPE:  Home sleep test.    INDICATION:  Suspected obstructive sleep apnea (ICD-10 code G47.33) in patient with snoring, witnessed apneic events, daytime somnolence, fatigue, depression, anxiety, the body mass index is 25.8, and the Cranston Sleepiness Scale score of 7/24.    RESULTS:  The patient underwent home sleep test with measurement of her nasal air flow, nasal air pressure, snoring, chest and abdominal wall motion, oximetry, and body position.  I have reviewed the entirety of the raw data of this study.  During this study, total recording time is 509 minutes.  The lights-out clock time is 9:06 p.m., the lights-on clock time is 5:35 a.m.  The apnea plus hypopnea index is 9.8 events per hour.  The supine apnea plus hypopnea index is 0.6 events per hour.  The average oxygen saturation is 94%, the lowest oxygen saturation is 83%, and the patient spent 0.6% of the test with saturations 88% or less.  The average heart rate is 54 beats per minute, the patient spent approximately 75% of the test in the supine position.    INTERPRETATION:  The data generated from this study is consistent with mild obstructive sleep apnea (ICD-10 code G47.33).    RECOMMENDATIONS:    1.   CPAP titration.   2.   Avoid alcohol.   3.   Avoid sedating drug.  4.   Patient should not drive if at all sleepy.     Please do not hesitate to contact me if there is any question whatsoever regarding interpretation of this study.    Dictated By Igor Gilliland MD  d: 04/03/2025 23:33:45  t: 04/03/2025 23:39:24  [facility] [id number]  PJC/    cc: Dana Grullon MD

## 2025-04-11 ENCOUNTER — PATIENT MESSAGE (OUTPATIENT)
Age: 55
End: 2025-04-11

## 2025-04-11 DIAGNOSIS — M05.79 RHEUMATOID ARTHRITIS INVOLVING MULTIPLE SITES WITH POSITIVE RHEUMATOID FACTOR (HCC): Primary | ICD-10-CM

## 2025-04-12 ENCOUNTER — PATIENT MESSAGE (OUTPATIENT)
Dept: INTERNAL MEDICINE CLINIC | Facility: CLINIC | Age: 55
End: 2025-04-12

## 2025-05-10 DIAGNOSIS — N94.10 DYSPAREUNIA IN FEMALE: ICD-10-CM

## 2025-05-12 RX ORDER — ESTRADIOL 10 UG/1
TABLET VAGINAL DAILY
Qty: 16 TABLET | Refills: 0 | Status: SHIPPED | OUTPATIENT
Start: 2025-05-12

## 2025-05-15 ENCOUNTER — TELEPHONE (OUTPATIENT)
Dept: INTERNAL MEDICINE CLINIC | Facility: CLINIC | Age: 55
End: 2025-05-15

## 2025-05-15 DIAGNOSIS — R06.83 SNORES: Primary | ICD-10-CM

## 2025-05-16 NOTE — TELEPHONE ENCOUNTER
I left detailed message on verbal release verified and identified voicemail # [phone] aware of Dr. Grullon's note below and referral placed, contact information provided; also making aware of [employer] message being sent with interpretation and recommendations. [1st attempt]    RN Triage - please check if patient read [employer] message, if not please make 2nd attempt to call

## 2025-05-16 NOTE — TELEPHONE ENCOUNTER
Received message that sleep study was denied-referred to pulmonary  RN,s plls let pt know andask her to f/u pulm

## 2025-05-19 DIAGNOSIS — M05.79 RHEUMATOID ARTHRITIS INVOLVING MULTIPLE SITES WITH POSITIVE RHEUMATOID FACTOR (HCC): ICD-10-CM

## 2025-05-19 RX ORDER — MEDROXYPROGESTERONE ACETATE 150 MG/ML
50 INJECTION, SUSPENSION INTRAMUSCULAR WEEKLY
Qty: 12 ML | Refills: 0 | Status: SHIPPED | OUTPATIENT
Start: 2025-05-19

## 2025-05-19 NOTE — TELEPHONE ENCOUNTER
LOV: 11/25/24  Future Appointments   Date Time Provider Department Center   6/4/2025  2:40 PM Smita Heck MD [facility]   7/2/2025  6:45 PM Jones, Paulette, PT [facility] [address]   7/23/2025  6:00 PM Jones, Paulette, PT [facility] [address]   7/30/2025  6:45 PM Jones, Paulette, PT [facility] [address]   8/6/2025  6:45 PM Jones, Paulette, PT [facility] [address]   8/20/2025  6:45 PM Jones, Paulette, PT [facility] [address]   8/27/2025  6:45 PM Jones, Paulette, PT [facility] [address]   9/3/2025  6:45 PM Jones, Paulette, PT [facility] [address]   9/10/2025  6:45 PM Jones, Paulette, PT [facility] [address]   9/17/2025  6:45 PM Jones, Paulette, PT [facility] [address]   9/24/2025  6:45 PM Jones, Paulette, PT [facility] [address]     ASSESSMENT/PLAN:      Seropositive RA- stable  - Found to have + RF and CCP.  No erosions on x-rays  - Currently on Enbrel weekly.  Recently added methotrexate due to recurrent pain and swelling in her hands.  Symptoms have improved  - She will continue Enbrel weekly methotrexate 4 pills weekly and folic acid daily  - Blood work in November was normal  - She had a flare in July with bilateral foot pain.  She went on a Medrol Dosepak and her pain improved  - She will repeat blood work next month and continue to monitor every 3 to 4 months     Lichen sclerosis  - Now on methotrexate and this is improved     Pt will f/u in 6 mos      There is a longitudinal care relationship with me, the care plan reflects the ongoing nature of the continuous relationship of care, and the medical record indicates that there is ongoing treatment of a serious/complex medical condition which I am currently managing.  is Applicable.      Smita Heck MD  11/25/2024  3:03 PM

## 2025-06-30 DIAGNOSIS — Z51.81 ENCOUNTER FOR THERAPEUTIC DRUG MONITORING: ICD-10-CM

## 2025-06-30 DIAGNOSIS — M05.79 RHEUMATOID ARTHRITIS INVOLVING MULTIPLE SITES WITH POSITIVE RHEUMATOID FACTOR (HCC): ICD-10-CM

## 2025-06-30 RX ORDER — METHOTREXATE 2.5 MG/1
TABLET ORAL
Qty: 52 TABLET | Refills: 1 | Status: SHIPPED | OUTPATIENT
Start: 2025-06-30

## 2025-06-30 NOTE — TELEPHONE ENCOUNTER
LOV: 11/25/24  Future Appointments   Date Time Provider Department Center   7/2/2025  6:45 PM Jones, Paulette, PT [facility] [address]   7/23/2025  6:00 PM Jones, Paulette, PT [facility] [address]   7/30/2025  6:45 PM Jones, Paulette, PT [facility] [address]   8/6/2025  6:45 PM Jones, Paulette, PT [facility] [address]   8/20/2025  6:45 PM Jones, Paulette, PT [facility] [address]   8/27/2025  6:45 PM Jones, Paulette, PT [facility] [address]   9/3/2025  6:45 PM Jones, Paulette, PT [facility] [address]   9/10/2025  6:45 PM Jones, Paulette, PT [facility] [address]   9/17/2025  6:45 PM Jones, Paulette, PT [facility] [address]   9/24/2025  6:45 PM Jones, Paulette, PT [facility] [address]   11/25/2025  3:00 PM Smita Heck MD [facility]   1/12/2026  3:30 PM [facility]     Labs: AST 22 ALT 16 3/16/25  ASSESSMENT/PLAN:      Seropositive RA- stable  - Found to have + RF and CCP.  No erosions on x-rays  - Currently on Enbrel weekly.  Recently added methotrexate due to recurrent pain and swelling in her hands.  Symptoms have improved  - She will continue Enbrel weekly methotrexate 4 pills weekly and folic acid daily  - Blood work in November was normal  - She had a flare in July with bilateral foot pain.  She went on a Medrol Dosepak and her pain improved  - She will repeat blood work next month and continue to monitor every 3 to 4 months     Lichen sclerosis  - Now on methotrexate and this is improved     Pt will f/u in 6 mos      There is a longitudinal care relationship with me, the care plan reflects the ongoing nature of the continuous relationship of care, and the medical record indicates that there is ongoing treatment of a serious/complex medical condition which I am currently managing.  is Applicable.      Smita Heck MD  11/25/2024  3:03 PM

## 2025-07-01 ENCOUNTER — TELEPHONE (OUTPATIENT)
Dept: PHYSICAL THERAPY | Facility: HOSPITAL | Age: 55
End: 2025-07-01

## 2025-07-02 ENCOUNTER — OFFICE VISIT (OUTPATIENT)
Dept: PHYSICAL THERAPY | Age: 55
End: 2025-07-02
Attending: OBSTETRICS & GYNECOLOGY
Payer: COMMERCIAL

## 2025-07-02 DIAGNOSIS — N94.10 DYSPAREUNIA IN FEMALE: Primary | ICD-10-CM

## 2025-07-02 PROCEDURE — 97162 PT EVAL MOD COMPLEX 30 MIN: CPT

## 2025-07-02 PROCEDURE — 97530 THERAPEUTIC ACTIVITIES: CPT

## 2025-07-02 PROCEDURE — 97112 NEUROMUSCULAR REEDUCATION: CPT
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
## 2025-07-07 DIAGNOSIS — M05.79 RHEUMATOID ARTHRITIS INVOLVING MULTIPLE SITES WITH POSITIVE RHEUMATOID FACTOR (HCC): ICD-10-CM

## 2025-07-07 DIAGNOSIS — Z51.81 ENCOUNTER FOR THERAPEUTIC DRUG MONITORING: ICD-10-CM

## 2025-07-08 RX ORDER — FOLIC ACID 1 MG/1
1 TABLET ORAL DAILY
Qty: 90 TABLET | Refills: 1 | Status: SHIPPED | OUTPATIENT
Start: 2025-07-08

## 2025-07-08 NOTE — TELEPHONE ENCOUNTER
LOV: 11/25/24  Future Appointments   Date Time Provider Department Center   7/23/2025  6:00 PM JonesFaridehPaulette, PT [facility] [address]   7/30/2025  6:45 PM Jones, Paulette, PT [facility] [address]   8/6/2025  6:45 PM Jones, Paulette, PT [facility] [address]   8/20/2025  6:45 PM Jones, Paulette, PT [facility] [address]   8/27/2025  6:45 PM Jones, Paulette, PT [facility] [address]   9/3/2025  6:45 PM Jones, Paulette, PT [facility] [address]   9/10/2025  6:45 PM Jones, Paulette, PT [facility] [address]   9/17/2025  6:45 PM Jones, Paulette, PT [facility] [address]   9/24/2025  6:45 PM Jones, Paulette, PT [facility] [address]   11/25/2025  3:00 [facility]   1/12/2026  3:30 PM [facility]il[facility]     ASSESSMENT/PLAN:      Seropositive RA- stable  - Found to have + RF and CCP.  No erosions on x-rays  - Currently on Enbrel weekly.  Recently added methotrexate due to recurrent pain and swelling in her hands.  Symptoms have improved  - She will continue Enbrel weekly methotrexate 4 pills weekly and folic acid daily  - Blood work in November was normal  - She had a flare in July with bilateral foot pain.  She went on a Medrol Dosepak and her pain improved  - She will repeat blood work next month and continue to monitor every 3 to 4 months     Lichen sclerosis  - Now on methotrexate and this is improved     Pt will f/u in 6 mos      There is a longitudinal care relationship with me, the care plan reflects the ongoing nature of the continuous relationship of care, and the medical record indicates that there is ongoing treatment of a serious/complex medical condition which I am currently managing.  is Applicable.      Smita Heck MD  11/25/2024  3:03 PM

## 2025-07-23 ENCOUNTER — APPOINTMENT (OUTPATIENT)
Dept: PHYSICAL THERAPY | Age: 55
End: 2025-07-23
Attending: OBSTETRICS & GYNECOLOGY
Payer: COMMERCIAL

## 2025-07-30 ENCOUNTER — OFFICE VISIT (OUTPATIENT)
Dept: PHYSICAL THERAPY | Age: 55
End: 2025-07-30
Attending: OBSTETRICS & GYNECOLOGY
Payer: COMMERCIAL

## 2025-07-30 PROCEDURE — 97140 MANUAL THERAPY 1/> REGIONS: CPT

## 2025-07-30 PROCEDURE — 97112 NEUROMUSCULAR REEDUCATION: CPT

## 2025-08-04 DIAGNOSIS — M05.79 RHEUMATOID ARTHRITIS INVOLVING MULTIPLE SITES WITH POSITIVE RHEUMATOID FACTOR (HCC): ICD-10-CM

## 2025-08-04 RX ORDER — MEDROXYPROGESTERONE ACETATE 150 MG/ML
50 INJECTION, SUSPENSION INTRAMUSCULAR WEEKLY
Qty: 12 ML | Refills: 0 | Status: SHIPPED | OUTPATIENT
Start: 2025-08-04

## 2025-08-06 ENCOUNTER — TELEPHONE (OUTPATIENT)
Dept: PHYSICAL THERAPY | Age: 55
End: 2025-08-06

## 2025-08-06 ENCOUNTER — OFFICE VISIT (OUTPATIENT)
Dept: PHYSICAL THERAPY | Age: 55
End: 2025-08-06
Attending: OBSTETRICS & GYNECOLOGY

## 2025-08-06 PROCEDURE — 97140 MANUAL THERAPY 1/> REGIONS: CPT

## 2025-08-06 PROCEDURE — 97112 NEUROMUSCULAR REEDUCATION: CPT

## 2025-08-08 RX ORDER — CITALOPRAM HYDROBROMIDE 20 MG/1
20 TABLET ORAL DAILY
Qty: 90 TABLET | Refills: 0 | Status: SHIPPED | OUTPATIENT
Start: 2025-08-08

## 2025-08-20 ENCOUNTER — OFFICE VISIT (OUTPATIENT)
Dept: PHYSICAL THERAPY | Age: 55
End: 2025-08-20
Attending: OBSTETRICS & GYNECOLOGY

## 2025-08-20 ENCOUNTER — TELEPHONE (OUTPATIENT)
Dept: PHYSICAL THERAPY | Age: 55
End: 2025-08-20

## 2025-08-20 PROCEDURE — 97140 MANUAL THERAPY 1/> REGIONS: CPT

## 2025-08-20 PROCEDURE — 97112 NEUROMUSCULAR REEDUCATION: CPT

## 2025-08-26 ENCOUNTER — OFFICE VISIT (OUTPATIENT)
Dept: PHYSICAL THERAPY | Age: 55
End: 2025-08-26
Attending: OBSTETRICS & GYNECOLOGY

## 2025-08-26 PROCEDURE — 97112 NEUROMUSCULAR REEDUCATION: CPT

## 2025-08-26 PROCEDURE — 97140 MANUAL THERAPY 1/> REGIONS: CPT

## 2025-08-27 ENCOUNTER — APPOINTMENT (OUTPATIENT)
Dept: PHYSICAL THERAPY | Age: 55
End: 2025-08-27
Attending: OBSTETRICS & GYNECOLOGY

## 2025-08-29 ENCOUNTER — LAB ENCOUNTER (OUTPATIENT)
Dept: LAB | Age: 55
End: 2025-08-29
Attending: INTERNAL MEDICINE

## 2025-08-29 DIAGNOSIS — M05.79 RHEUMATOID ARTHRITIS INVOLVING MULTIPLE SITES WITH POSITIVE RHEUMATOID FACTOR (HCC): ICD-10-CM

## 2025-08-29 DIAGNOSIS — Z51.81 ENCOUNTER FOR THERAPEUTIC DRUG MONITORING: ICD-10-CM

## 2025-08-29 LAB
ALBUMIN SERPL-MCNC: 4.7 G/DL (ref 3.2–4.8)
ALT SERPL-CCNC: 15 U/L (ref 10–49)
AST SERPL-CCNC: 21 U/L (ref ?–34)
BASOPHILS # BLD AUTO: 0.06 X10(3) UL (ref 0–0.2)
BASOPHILS NFR BLD AUTO: 1.1 %
CREAT BLD-MCNC: 1.16 MG/DL (ref 0.55–1.02)
CRP SERPL-MCNC: <0.5 MG/DL (ref ?–0.5)
DEPRECATED RDW RBC AUTO: 41.4 FL (ref 35.1–46.3)
EGFRCR SERPLBLD CKD-EPI 2021: 56 ML/MIN/1.73M2 (ref 60–?)
EOSINOPHIL # BLD AUTO: 0.13 X10(3) UL (ref 0–0.7)
EOSINOPHIL NFR BLD AUTO: 2.3 %
ERYTHROCYTE [DISTWIDTH] IN BLOOD BY AUTOMATED COUNT: 12.5 % (ref 11–15)
ERYTHROCYTE [SEDIMENTATION RATE] IN BLOOD: 19 MM/HR (ref 0–30)
HCT VFR BLD AUTO: 36.5 % (ref 35–48)
HGB BLD-MCNC: 12.6 G/DL (ref 12–16)
IMM GRANULOCYTES # BLD AUTO: 0 X10(3) UL (ref 0–1)
IMM GRANULOCYTES NFR BLD: 0 %
LYMPHOCYTES # BLD AUTO: 1.84 X10(3) UL (ref 1–4)
LYMPHOCYTES NFR BLD AUTO: 33 %
MCH RBC QN AUTO: 31.4 PG (ref 26–34)
MCHC RBC AUTO-ENTMCNC: 34.5 G/DL (ref 31–37)
MCV RBC AUTO: 91 FL (ref 80–100)
MONOCYTES # BLD AUTO: 0.46 X10(3) UL (ref 0.1–1)
MONOCYTES NFR BLD AUTO: 8.3 %
NEUTROPHILS # BLD AUTO: 3.08 X10 (3) UL (ref 1.5–7.7)
NEUTROPHILS # BLD AUTO: 3.08 X10(3) UL (ref 1.5–7.7)
NEUTROPHILS NFR BLD AUTO: 55.3 %
PLATELET # BLD AUTO: 337 10(3)UL (ref 150–450)
RBC # BLD AUTO: 4.01 X10(6)UL (ref 3.8–5.3)
WBC # BLD AUTO: 5.6 X10(3) UL (ref 4–11)

## 2025-08-29 PROCEDURE — 85652 RBC SED RATE AUTOMATED: CPT

## 2025-08-29 PROCEDURE — 82040 ASSAY OF SERUM ALBUMIN: CPT

## 2025-08-29 PROCEDURE — 84460 ALANINE AMINO (ALT) (SGPT): CPT

## 2025-08-29 PROCEDURE — 86480 TB TEST CELL IMMUN MEASURE: CPT | Performed by: INTERNAL MEDICINE

## 2025-08-29 PROCEDURE — 86140 C-REACTIVE PROTEIN: CPT

## 2025-08-29 PROCEDURE — 84450 TRANSFERASE (AST) (SGOT): CPT

## 2025-08-29 PROCEDURE — 85025 COMPLETE CBC W/AUTO DIFF WBC: CPT

## 2025-08-29 PROCEDURE — 82565 ASSAY OF CREATININE: CPT

## 2025-08-29 PROCEDURE — 36415 COLL VENOUS BLD VENIPUNCTURE: CPT

## 2025-09-01 LAB
M TB IFN-G CD4+ T-CELLS BLD-ACNC: 0.05 IU/ML
M TB TUBERC IFN-G BLD QL: NEGATIVE
M TB TUBERC IGNF/MITOGEN IGNF CONTROL: 2.47 IU/ML
QFT TB1 AG MINUS NIL: 0 IU/ML
QFT TB2 AG MINUS NIL: 0.02 IU/ML

## (undated) NOTE — LETTER
Rebekah Ramos, :1970    CONSENT FOR PROCEDURE/SEDATION    1. I authorize the performance upon Rebekah Ramos  the following: Vulvar Biopsy    2. I authorize Dr. Anna Molina MD (and whomever is designated as the doctor’s assistant), to perform the above-mentioned procedures.    3. If any unforeseen conditions arise during this procedure calling for additional  procedures, operations, or medications (including anesthesia and blood transfusion), I further request and authorize the doctor to do whatever he/she deems advisable in my interest.    4. I consent to the taking and reproduction of any photographs in the course of this procedure for professional purposes.    5. I consent to the administration of such sedation as may be considered necessary or advisable by the physician responsible for this service, with the exception of ______________________________________________________    6. I have been informed by my doctor of the nature and purpose of this procedure sedation, possible alternative methods of treatment, risk involved and possible complications.    Signature of Patient:_______________________________________________    Signature of person authorized to consent for patient:  _______________________________________________________________    Relationship to patient: ____________________________________________    Witness: _________________________________________ Date:___________     Physician Signature: _______________________________ Date:___________